# Patient Record
Sex: FEMALE | Race: WHITE | Employment: FULL TIME | URBAN - METROPOLITAN AREA
[De-identification: names, ages, dates, MRNs, and addresses within clinical notes are randomized per-mention and may not be internally consistent; named-entity substitution may affect disease eponyms.]

---

## 2017-05-19 ENCOUNTER — EMPLOYEE WELLNESS (OUTPATIENT)
Dept: OTHER | Age: 62
End: 2017-05-19

## 2017-05-19 LAB
CHOLESTEROL/HDL RATIO: 3.1
CHOLESTEROL: 191 MG/DL
GLUCOSE BLD-MCNC: 96 MG/DL (ref 70–99)
HDLC SERPL-MCNC: 61 MG/DL
LDL CHOLESTEROL: 104 MG/DL (ref 0–130)
PATIENT FASTING?: YES
TRIGL SERPL-MCNC: 128 MG/DL
VLDLC SERPL CALC-MCNC: NORMAL MG/DL (ref 1–30)

## 2018-03-20 VITALS — WEIGHT: 192 LBS | BODY MASS INDEX: 35.69 KG/M2

## 2018-05-18 ENCOUNTER — EMPLOYEE WELLNESS (OUTPATIENT)
Dept: OTHER | Age: 63
End: 2018-05-18

## 2018-05-18 LAB
CHOLESTEROL/HDL RATIO: 3.5
CHOLESTEROL: 194 MG/DL
GLUCOSE BLD-MCNC: 92 MG/DL (ref 70–99)
HDLC SERPL-MCNC: 56 MG/DL
LDL CHOLESTEROL: 118 MG/DL (ref 0–130)
PATIENT FASTING?: YES
TRIGL SERPL-MCNC: 99 MG/DL
VLDLC SERPL CALC-MCNC: NORMAL MG/DL (ref 1–30)

## 2018-05-29 VITALS — WEIGHT: 183 LBS | BODY MASS INDEX: 34.02 KG/M2

## 2018-07-28 ENCOUNTER — HOSPITAL ENCOUNTER (EMERGENCY)
Age: 63
Discharge: HOME | End: 2018-07-28
Attending: EMERGENCY MEDICINE
Payer: COMMERCIAL

## 2018-07-28 ENCOUNTER — APPOINTMENT (OUTPATIENT)
Dept: GENERAL RADIOLOGY | Age: 63
End: 2018-07-28
Payer: COMMERCIAL

## 2018-07-28 ENCOUNTER — NURSE TRIAGE (OUTPATIENT)
Dept: OTHER | Facility: CLINIC | Age: 63
End: 2018-07-28

## 2018-07-28 VITALS
HEIGHT: 62 IN | RESPIRATION RATE: 16 BRPM | SYSTOLIC BLOOD PRESSURE: 150 MMHG | WEIGHT: 180 LBS | OXYGEN SATURATION: 99 % | DIASTOLIC BLOOD PRESSURE: 105 MMHG | HEART RATE: 105 BPM | TEMPERATURE: 98.3 F | BODY MASS INDEX: 33.13 KG/M2

## 2018-07-28 DIAGNOSIS — S62.331A CLOSED DISPLACED FRACTURE OF NECK OF SECOND METACARPAL BONE OF LEFT HAND, INITIAL ENCOUNTER: Primary | ICD-10-CM

## 2018-07-28 DIAGNOSIS — S52.572A OTHER CLOSED INTRA-ARTICULAR FRACTURE OF DISTAL END OF LEFT RADIUS, INITIAL ENCOUNTER: ICD-10-CM

## 2018-07-28 PROCEDURE — 6370000000 HC RX 637 (ALT 250 FOR IP): Performed by: PHYSICIAN ASSISTANT

## 2018-07-28 PROCEDURE — 29126 APPL SHORT ARM SPLINT DYN: CPT

## 2018-07-28 PROCEDURE — 99283 EMERGENCY DEPT VISIT LOW MDM: CPT

## 2018-07-28 PROCEDURE — 73130 X-RAY EXAM OF HAND: CPT

## 2018-07-28 PROCEDURE — 73090 X-RAY EXAM OF FOREARM: CPT

## 2018-07-28 RX ORDER — IBUPROFEN 800 MG/1
800 TABLET ORAL EVERY 8 HOURS PRN
Qty: 20 TABLET | Refills: 0 | Status: SHIPPED | OUTPATIENT
Start: 2018-07-28 | End: 2018-11-13

## 2018-07-28 RX ORDER — IBUPROFEN 800 MG/1
800 TABLET ORAL ONCE
Status: COMPLETED | OUTPATIENT
Start: 2018-07-28 | End: 2018-07-28

## 2018-07-28 RX ORDER — IBUPROFEN 200 MG
200 TABLET ORAL EVERY 6 HOURS PRN
COMMUNITY
End: 2018-11-13

## 2018-07-28 RX ADMIN — IBUPROFEN 800 MG: 800 TABLET ORAL at 13:16

## 2018-07-28 ASSESSMENT — PAIN DESCRIPTION - ORIENTATION: ORIENTATION: LEFT

## 2018-07-28 ASSESSMENT — PAIN SCALES - GENERAL: PAINLEVEL_OUTOF10: 7

## 2018-07-28 ASSESSMENT — PAIN DESCRIPTION - PAIN TYPE: TYPE: ACUTE PAIN

## 2018-07-28 ASSESSMENT — PAIN DESCRIPTION - LOCATION: LOCATION: ARM

## 2018-07-28 NOTE — ED PROVIDER NOTES
[address]  [address]  eMERGENCY dEPARTMENT eNCOUnter   Attending Attestation     Pt Name: Kassie Shelton  MRN: [id number]  [place] 1955  Date of evaluation: 7/28/18   Kassie Shelton is a 58 y.o. female with CC: Arm Injury (left )    MDM:     I personally evaluated and examined the patient in conjunction with the APC and agree with the assessment, treatment plan, and disposition of the patient as recorded by the APC.    Justyna Isabel MD  Attending Emergency Physician           Justyna Isabel MD  07/28/18 [phone]

## 2018-07-28 NOTE — ED NOTES
Applied volar splint and sling to pt's left arm. Pt tolerated well.       Vijay Wright RN  07/28/18 [id number]

## 2018-07-28 NOTE — ED PROVIDER NOTES
[address]  eMERGENCY dEPARTMENT eNCOUnter      Pt Name: Kassie Shelton  MRN: [id number]  [place] 1955  Date of evaluation: 7/28/2018  Provider: Chuy Barrett PA-C    CHIEF COMPLAINT       Chief Complaint   Patient presents with    Arm Injury     left            HISTORY OF PRESENT ILLNESS  (Location/Symptom, Timing/Onset, Context/Setting, Quality, Duration, Modifying Factors, Severity.)   Kassie Shelton is a 58 y.o. female who presents to the emergency department with complaints of left arm pain. Pt states she was pulling franklin of hay off of a wagon when she lost her balance and fell backwards landing on outstretched arm. Pt admits to pain and swelling in distal forearm and hand. Pt has mild soreness in left shoulder. Denies hitting head or loc. No numbness. Pt is right handed. No other complaints. Nursing Notes were reviewed. REVIEW OF SYSTEMS    (2-9 systems for level 4, 10 or more for level 5)     Review of Systems   Left arm injury     Except as noted above the remainder of the review of systems was reviewed and negative. PAST MEDICAL HISTORY   History reviewed. No pertinent past medical history. None otherwise stated in nurses notes    SURGICAL HISTORY       Past Surgical History:   Procedure Laterality Date    KNEE SURGERY      LAPAROSCOPY      tubal     None otherwise stated in nurses notes    CURRENT MEDICATIONS       Previous Medications    IBUPROFEN (ADVIL;MOTRIN) 200 MG TABLET    Take 200 mg by mouth every 6 hours as needed for Pain       ALLERGIES     Patient has no known allergies. FAMILY HISTORY       Family History   Problem Relation Age of Onset    Diabetes Father     Hypertension Father     Diabetes Mother     Hypertension Mother      No family status information on file. None otherwise stated in nurses notes    SOCIAL HISTORY      reports that she has never smoked.  She has never used smokeless tobacco. She reports that she does not drink alcohol or use drugs. lives at home with others     PHYSICAL EXAM    (up to 7 for level 4, 8 or more for level 5)     ED Triage Vitals [07/28/18 1147]   BP Temp Temp src Pulse Resp SpO2 Height Weight   (!) 150/105 98.3 °F (36.8 °C) -- 105 16 99 % 5' 2\" (1.575 m) 180 lb (81.6 kg)       Physical Exam   Nursing note and vitals reviewed. Constitutional: Oriented to person, place, and time and well-developed, well-nourished. Head: Normocephalic and atraumatic. Ear: External ears normal.   Nose: Nose normal and midline. Eyes: Conjunctivae and EOM are normal. Pupils are equal, round, and reactive to light. Neck: Normal range of motion. Neck supple. Throat: Posterior pharynx is without erythema or exudates, airway is patent, no swelling  Cardiovascular: Normal rate, regular rhythm, normal heart sounds and intact distal pulses. Pulmonary/Chest: Effort normal and breath sounds normal. No respiratory distress. No wheezes. No rales. No chest tenderness. Abdominal: Soft. Bowel sounds are normal. No distension and no mass. There is no tenderness. There is no rebound and no guarding. Musculoskeletal: Examination of left arm reveals Normal range of motion of shoulder and elbow. Swelling, erythema, deformity noted over distal radius and ulna. Tenderness in this area and 2nd, 3rd metacarpals. No snuff box tenderness. Good  strength. Brisk cap refill. Distal sensation intact. 2/2 radial pulse. [place] Neurological: Alert and oriented to person, place, and time. GCS score is 15. Skin: Skin is warm and dry. No rash noted. No erythema. No pallor.    Psychiatric: Mood, memory, affect and judgment normal.           DIAGNOSTIC RESULTS     EKG: All EKG's are interpreted by the Emergency Department Physician who either signs or Co-signs this chart in the absence of a cardiologist.        RADIOLOGY:   All plain film, CT, MRI, and formal ultrasound images (except ED bedside ultrasound) are read by the radiologist, see reports

## 2018-07-30 ENCOUNTER — OFFICE VISIT (OUTPATIENT)
Dept: ORTHOPEDIC SURGERY | Age: 63
End: 2018-07-30
Payer: COMMERCIAL

## 2018-07-30 DIAGNOSIS — S52.532A CLOSED COLLES' FRACTURE OF LEFT RADIUS, INITIAL ENCOUNTER: Primary | ICD-10-CM

## 2018-07-30 DIAGNOSIS — M80.00XA AGE-RELATED OSTEOPOROSIS WITH CURRENT PATHOLOGICAL FRACTURE, INITIAL ENCOUNTER: ICD-10-CM

## 2018-07-30 DIAGNOSIS — S62.361A CLOSED NONDISPLACED FRACTURE OF NECK OF SECOND METACARPAL BONE OF LEFT HAND, INITIAL ENCOUNTER: ICD-10-CM

## 2018-07-30 PROCEDURE — 99203 OFFICE O/P NEW LOW 30 MIN: CPT | Performed by: ORTHOPAEDIC SURGERY

## 2018-07-30 NOTE — PROGRESS NOTES
Subjective:      Patient ID: Ronald Mejia is a 58 y.o. female. HPI    Patient pre-stance with a left Colles' severely comminuted intra-articular fracture and a index finger metacarpal head neck fracture post falling off a hay wagon        Review of Systems    Objective:   Physical Exam   Constitutional: She is oriented to person, place, and time. She appears well-developed and well-nourished. HENT:   Head: Normocephalic and atraumatic. Eyes: Conjunctivae and EOM are normal.   Neck: Normal range of motion. Pulmonary/Chest: Effort normal. No respiratory distress. Neurological: She is alert and oriented to person, place, and time. She has normal strength. No sensory deficit. Normal gait   Skin: Skin is warm and dry. Psychiatric: Her behavior is normal. Thought content normal.   Nursing note and vitals reviewed. diagnostic x-rays left wrist reveal impacted comminuted intra-articular Colles' fracture with apparent volar and dorsal column injuries    Patient with left index finger metacarpal head neck fracture significantly shortened    Assessment:      Encounter Diagnoses   Name Primary?     Closed Colles' fracture of left radius, initial encounter Yes    Age-related osteoporosis with current pathological fracture, initial encounter            Plan:      Recommend surgical treatment

## 2018-07-31 ENCOUNTER — ANESTHESIA EVENT (OUTPATIENT)
Dept: OPERATING ROOM | Age: 63
End: 2018-07-31
Payer: COMMERCIAL

## 2018-08-01 ENCOUNTER — APPOINTMENT (OUTPATIENT)
Dept: GENERAL RADIOLOGY | Age: 63
End: 2018-08-01
Attending: ORTHOPAEDIC SURGERY
Payer: COMMERCIAL

## 2018-08-01 ENCOUNTER — ANESTHESIA (OUTPATIENT)
Dept: OPERATING ROOM | Age: 63
End: 2018-08-01
Payer: COMMERCIAL

## 2018-08-01 ENCOUNTER — HOSPITAL ENCOUNTER (OUTPATIENT)
Age: 63
Setting detail: OUTPATIENT SURGERY
Discharge: HOME | End: 2018-08-01
Attending: ORTHOPAEDIC SURGERY | Admitting: ORTHOPAEDIC SURGERY
Payer: COMMERCIAL

## 2018-08-01 VITALS — OXYGEN SATURATION: 99 % | SYSTOLIC BLOOD PRESSURE: 107 MMHG | TEMPERATURE: 96.8 F | DIASTOLIC BLOOD PRESSURE: 69 MMHG

## 2018-08-01 VITALS
TEMPERATURE: 98 F | RESPIRATION RATE: 18 BRPM | HEART RATE: 89 BPM | SYSTOLIC BLOOD PRESSURE: 136 MMHG | BODY MASS INDEX: 33.13 KG/M2 | OXYGEN SATURATION: 94 % | HEIGHT: 62 IN | DIASTOLIC BLOOD PRESSURE: 73 MMHG | WEIGHT: 180 LBS

## 2018-08-01 DIAGNOSIS — S52.532A CLOSED COLLES' FRACTURE OF LEFT RADIUS, INITIAL ENCOUNTER: Primary | ICD-10-CM

## 2018-08-01 PROCEDURE — 73100 X-RAY EXAM OF WRIST: CPT

## 2018-08-01 PROCEDURE — 7100000000 HC PACU RECOVERY - FIRST 15 MIN: Performed by: ORTHOPAEDIC SURGERY

## 2018-08-01 PROCEDURE — 7100000030 HC ASPR PHASE II RECOVERY - FIRST 15 MIN: Performed by: ORTHOPAEDIC SURGERY

## 2018-08-01 PROCEDURE — 2709999900 HC NON-CHARGEABLE SUPPLY: Performed by: ORTHOPAEDIC SURGERY

## 2018-08-01 PROCEDURE — 3600000013 HC SURGERY LEVEL 3 ADDTL 15MIN: Performed by: ORTHOPAEDIC SURGERY

## 2018-08-01 PROCEDURE — 3600000003 HC SURGERY LEVEL 3 BASE: Performed by: ORTHOPAEDIC SURGERY

## 2018-08-01 PROCEDURE — 7100000031 HC ASPR PHASE II RECOVERY - ADDTL 15 MIN: Performed by: ORTHOPAEDIC SURGERY

## 2018-08-01 PROCEDURE — 6360000002 HC RX W HCPCS: Performed by: ORTHOPAEDIC SURGERY

## 2018-08-01 PROCEDURE — 3700000000 HC ANESTHESIA ATTENDED CARE: Performed by: ORTHOPAEDIC SURGERY

## 2018-08-01 PROCEDURE — 6360000002 HC RX W HCPCS: Performed by: ANESTHESIOLOGY

## 2018-08-01 PROCEDURE — 2720000010 HC SURG SUPPLY STERILE: Performed by: ORTHOPAEDIC SURGERY

## 2018-08-01 PROCEDURE — C1713 ANCHOR/SCREW BN/BN,TIS/BN: HCPCS | Performed by: ORTHOPAEDIC SURGERY

## 2018-08-01 PROCEDURE — 2580000003 HC RX 258: Performed by: NURSE ANESTHETIST, CERTIFIED REGISTERED

## 2018-08-01 PROCEDURE — 7100000001 HC PACU RECOVERY - ADDTL 15 MIN: Performed by: ORTHOPAEDIC SURGERY

## 2018-08-01 PROCEDURE — 3700000001 HC ADD 15 MINUTES (ANESTHESIA): Performed by: ORTHOPAEDIC SURGERY

## 2018-08-01 PROCEDURE — 6360000002 HC RX W HCPCS: Performed by: NURSE ANESTHETIST, CERTIFIED REGISTERED

## 2018-08-01 PROCEDURE — 2580000003 HC RX 258: Performed by: ANESTHESIOLOGY

## 2018-08-01 PROCEDURE — 25607 OPTX DST RD XARTC FX/EPI SEP: CPT | Performed by: ORTHOPAEDIC SURGERY

## 2018-08-01 PROCEDURE — 6370000000 HC RX 637 (ALT 250 FOR IP): Performed by: ANESTHESIOLOGY

## 2018-08-01 PROCEDURE — 3209999900 FLUORO FOR SURGICAL PROCEDURES

## 2018-08-01 PROCEDURE — 73120 X-RAY EXAM OF HAND: CPT

## 2018-08-01 PROCEDURE — 26608 TREAT METACARPAL FRACTURE: CPT | Performed by: ORTHOPAEDIC SURGERY

## 2018-08-01 DEVICE — SCREW BNE L14MM DIA2.7MM CORT S STL ST T8 STARDRV RECESS: Type: IMPLANTABLE DEVICE | Site: WRIST | Status: FUNCTIONAL

## 2018-08-01 DEVICE — SCREW BNE L14MM DIA2.4MM S STL ST LOK FULL THRD T8 STARDRV: Type: IMPLANTABLE DEVICE | Site: WRIST | Status: FUNCTIONAL

## 2018-08-01 DEVICE — SCREW BNE L20MM DIA2.4MM S STL ST LOK FULL THRD T8 STARDRV: Type: IMPLANTABLE DEVICE | Site: WRIST | Status: FUNCTIONAL

## 2018-08-01 DEVICE — SCREW BNE L24MM DIA2.4MM S STL ST LOK FULL THRD T8 STARDRV: Type: IMPLANTABLE DEVICE | Site: WRIST | Status: FUNCTIONAL

## 2018-08-01 DEVICE — IMPLANTABLE DEVICE: Type: IMPLANTABLE DEVICE | Site: WRIST | Status: FUNCTIONAL

## 2018-08-01 DEVICE — SCREW BNE L16MM DIA2.7MM CORT S STL ST T8 STARDRV RECESS: Type: IMPLANTABLE DEVICE | Site: WRIST | Status: FUNCTIONAL

## 2018-08-01 RX ORDER — FENTANYL CITRATE 50 UG/ML
INJECTION, SOLUTION INTRAMUSCULAR; INTRAVENOUS PRN
Status: DISCONTINUED | OUTPATIENT
Start: 2018-08-01 | End: 2018-08-01 | Stop reason: SDUPTHER

## 2018-08-01 RX ORDER — ONDANSETRON 2 MG/ML
INJECTION INTRAMUSCULAR; INTRAVENOUS PRN
Status: DISCONTINUED | OUTPATIENT
Start: 2018-08-01 | End: 2018-08-01 | Stop reason: SDUPTHER

## 2018-08-01 RX ORDER — HYDROCODONE BITARTRATE AND ACETAMINOPHEN 5; 325 MG/1; MG/1
1-2 TABLET ORAL EVERY 4 HOURS PRN
Qty: 40 TABLET | Refills: 0 | Status: SHIPPED | OUTPATIENT
Start: 2018-08-01 | End: 2018-08-08

## 2018-08-01 RX ORDER — PROMETHAZINE HYDROCHLORIDE 25 MG/ML
6.25 INJECTION, SOLUTION INTRAMUSCULAR; INTRAVENOUS
Status: COMPLETED | OUTPATIENT
Start: 2018-08-01 | End: 2018-08-01

## 2018-08-01 RX ORDER — DIPHENHYDRAMINE HYDROCHLORIDE 50 MG/ML
12.5 INJECTION INTRAMUSCULAR; INTRAVENOUS
Status: DISCONTINUED | OUTPATIENT
Start: 2018-08-01 | End: 2018-08-01 | Stop reason: HOSPADM

## 2018-08-01 RX ORDER — SODIUM CHLORIDE 0.9 % (FLUSH) 0.9 %
10 SYRINGE (ML) INJECTION PRN
Status: DISCONTINUED | OUTPATIENT
Start: 2018-08-01 | End: 2018-08-01 | Stop reason: HOSPADM

## 2018-08-01 RX ORDER — SODIUM CHLORIDE 0.9 % (FLUSH) 0.9 %
10 SYRINGE (ML) INJECTION EVERY 12 HOURS SCHEDULED
Status: DISCONTINUED | OUTPATIENT
Start: 2018-08-01 | End: 2018-08-01 | Stop reason: HOSPADM

## 2018-08-01 RX ORDER — SODIUM CHLORIDE, SODIUM LACTATE, POTASSIUM CHLORIDE, CALCIUM CHLORIDE 600; 310; 30; 20 MG/100ML; MG/100ML; MG/100ML; MG/100ML
INJECTION, SOLUTION INTRAVENOUS CONTINUOUS PRN
Status: DISCONTINUED | OUTPATIENT
Start: 2018-08-01 | End: 2018-08-01 | Stop reason: SDUPTHER

## 2018-08-01 RX ORDER — PROPOFOL 10 MG/ML
INJECTION, EMULSION INTRAVENOUS PRN
Status: DISCONTINUED | OUTPATIENT
Start: 2018-08-01 | End: 2018-08-01 | Stop reason: SDUPTHER

## 2018-08-01 RX ORDER — CEFAZOLIN SODIUM 1 G/3ML
INJECTION, POWDER, FOR SOLUTION INTRAMUSCULAR; INTRAVENOUS
Status: DISCONTINUED
Start: 2018-08-01 | End: 2018-08-01 | Stop reason: HOSPADM

## 2018-08-01 RX ORDER — DEXAMETHASONE SODIUM PHOSPHATE 4 MG/ML
INJECTION, SOLUTION INTRA-ARTICULAR; INTRALESIONAL; INTRAMUSCULAR; INTRAVENOUS; SOFT TISSUE PRN
Status: DISCONTINUED | OUTPATIENT
Start: 2018-08-01 | End: 2018-08-01 | Stop reason: SDUPTHER

## 2018-08-01 RX ORDER — OXYCODONE HYDROCHLORIDE AND ACETAMINOPHEN 5; 325 MG/1; MG/1
1 TABLET ORAL
Status: COMPLETED | OUTPATIENT
Start: 2018-08-01 | End: 2018-08-01

## 2018-08-01 RX ORDER — SODIUM CHLORIDE, SODIUM LACTATE, POTASSIUM CHLORIDE, CALCIUM CHLORIDE 600; 310; 30; 20 MG/100ML; MG/100ML; MG/100ML; MG/100ML
INJECTION, SOLUTION INTRAVENOUS CONTINUOUS
Status: DISCONTINUED | OUTPATIENT
Start: 2018-08-01 | End: 2018-08-01 | Stop reason: HOSPADM

## 2018-08-01 RX ORDER — MORPHINE SULFATE 2 MG/ML
2 INJECTION, SOLUTION INTRAMUSCULAR; INTRAVENOUS EVERY 5 MIN PRN
Status: DISCONTINUED | OUTPATIENT
Start: 2018-08-01 | End: 2018-08-01 | Stop reason: HOSPADM

## 2018-08-01 RX ORDER — LIDOCAINE HYDROCHLORIDE 10 MG/ML
1 INJECTION, SOLUTION EPIDURAL; INFILTRATION; INTRACAUDAL; PERINEURAL
Status: DISCONTINUED | OUTPATIENT
Start: 2018-08-01 | End: 2018-08-01 | Stop reason: HOSPADM

## 2018-08-01 RX ORDER — MIDAZOLAM HYDROCHLORIDE 1 MG/ML
INJECTION INTRAMUSCULAR; INTRAVENOUS PRN
Status: DISCONTINUED | OUTPATIENT
Start: 2018-08-01 | End: 2018-08-01 | Stop reason: SDUPTHER

## 2018-08-01 RX ADMIN — MIDAZOLAM 2 MG: 1 INJECTION INTRAMUSCULAR; INTRAVENOUS at 13:36

## 2018-08-01 RX ADMIN — FENTANYL CITRATE 50 MCG: 50 INJECTION, SOLUTION INTRAMUSCULAR; INTRAVENOUS at 13:52

## 2018-08-01 RX ADMIN — ONDANSETRON 4 MG: 2 INJECTION INTRAMUSCULAR; INTRAVENOUS at 13:56

## 2018-08-01 RX ADMIN — HYDROMORPHONE HYDROCHLORIDE 0.5 MG: 1 INJECTION, SOLUTION INTRAMUSCULAR; INTRAVENOUS; SUBCUTANEOUS at 16:05

## 2018-08-01 RX ADMIN — Medication 2 G: at 13:38

## 2018-08-01 RX ADMIN — FENTANYL CITRATE 50 MCG: 50 INJECTION, SOLUTION INTRAMUSCULAR; INTRAVENOUS at 13:47

## 2018-08-01 RX ADMIN — SODIUM CHLORIDE, POTASSIUM CHLORIDE, SODIUM LACTATE AND CALCIUM CHLORIDE: 600; 310; 30; 20 INJECTION, SOLUTION INTRAVENOUS at 12:32

## 2018-08-01 RX ADMIN — FENTANYL CITRATE 50 MCG: 50 INJECTION, SOLUTION INTRAMUSCULAR; INTRAVENOUS at 14:33

## 2018-08-01 RX ADMIN — HYDROMORPHONE HYDROCHLORIDE 0.5 MG: 1 INJECTION, SOLUTION INTRAMUSCULAR; INTRAVENOUS; SUBCUTANEOUS at 15:53

## 2018-08-01 RX ADMIN — FENTANYL CITRATE 50 MCG: 50 INJECTION, SOLUTION INTRAMUSCULAR; INTRAVENOUS at 14:14

## 2018-08-01 RX ADMIN — PROMETHAZINE HYDROCHLORIDE 6.25 MG: 25 INJECTION INTRAMUSCULAR; INTRAVENOUS at 15:41

## 2018-08-01 RX ADMIN — OXYCODONE HYDROCHLORIDE AND ACETAMINOPHEN 1 TABLET: 5; 325 TABLET ORAL at 17:42

## 2018-08-01 RX ADMIN — SODIUM CHLORIDE, POTASSIUM CHLORIDE, SODIUM LACTATE AND CALCIUM CHLORIDE: 600; 310; 30; 20 INJECTION, SOLUTION INTRAVENOUS at 13:36

## 2018-08-01 RX ADMIN — DEXAMETHASONE SODIUM PHOSPHATE 4 MG: 4 INJECTION, SOLUTION INTRAMUSCULAR; INTRAVENOUS at 13:56

## 2018-08-01 RX ADMIN — PROPOFOL 200 MG: 10 INJECTION, EMULSION INTRAVENOUS at 13:41

## 2018-08-01 RX ADMIN — MORPHINE SULFATE 2 MG: 2 INJECTION, SOLUTION INTRAMUSCULAR; INTRAVENOUS at 15:37

## 2018-08-01 ASSESSMENT — PULMONARY FUNCTION TESTS
PIF_VALUE: 11
PIF_VALUE: 1
PIF_VALUE: 10
PIF_VALUE: 10
PIF_VALUE: 11
PIF_VALUE: 11
PIF_VALUE: 1
PIF_VALUE: 11
PIF_VALUE: 1
PIF_VALUE: 11
PIF_VALUE: 11
PIF_VALUE: 5
PIF_VALUE: 4
PIF_VALUE: 3
PIF_VALUE: 11
PIF_VALUE: 10
PIF_VALUE: 11
PIF_VALUE: 10
PIF_VALUE: 10
PIF_VALUE: 11
PIF_VALUE: 10
PIF_VALUE: 11
PIF_VALUE: 10
PIF_VALUE: 11
PIF_VALUE: 10
PIF_VALUE: 10
PIF_VALUE: 11
PIF_VALUE: 10
PIF_VALUE: 11
PIF_VALUE: 10
PIF_VALUE: 11
PIF_VALUE: 1
PIF_VALUE: 11
PIF_VALUE: 11
PIF_VALUE: 1
PIF_VALUE: 11
PIF_VALUE: 2
PIF_VALUE: 3
PIF_VALUE: 11
PIF_VALUE: 10
PIF_VALUE: 11
PIF_VALUE: 10
PIF_VALUE: 11
PIF_VALUE: 11
PIF_VALUE: 0
PIF_VALUE: 3
PIF_VALUE: 15
PIF_VALUE: 1
PIF_VALUE: 11
PIF_VALUE: 15
PIF_VALUE: 11
PIF_VALUE: 10
PIF_VALUE: 1
PIF_VALUE: 2
PIF_VALUE: 10
PIF_VALUE: 11
PIF_VALUE: 4
PIF_VALUE: 11

## 2018-08-01 ASSESSMENT — PAIN DESCRIPTION - ORIENTATION
ORIENTATION: LEFT

## 2018-08-01 ASSESSMENT — PAIN DESCRIPTION - DESCRIPTORS
DESCRIPTORS: ACHING

## 2018-08-01 ASSESSMENT — PAIN DESCRIPTION - LOCATION
LOCATION: WRIST

## 2018-08-01 ASSESSMENT — PAIN DESCRIPTION - PAIN TYPE
TYPE: SURGICAL PAIN

## 2018-08-01 ASSESSMENT — PAIN SCALES - GENERAL
PAINLEVEL_OUTOF10: 4
PAINLEVEL_OUTOF10: 9
PAINLEVEL_OUTOF10: 4
PAINLEVEL_OUTOF10: 7
PAINLEVEL_OUTOF10: 6
PAINLEVEL_OUTOF10: 8

## 2018-08-01 ASSESSMENT — PAIN - FUNCTIONAL ASSESSMENT: PAIN_FUNCTIONAL_ASSESSMENT: 0-10

## 2018-08-01 ASSESSMENT — PAIN DESCRIPTION - FREQUENCY: FREQUENCY: CONTINUOUS

## 2018-08-01 ASSESSMENT — ENCOUNTER SYMPTOMS: STRIDOR: 0

## 2018-08-01 NOTE — H&P
HISTORY and [address]       NAME:  Mary Colunga  MRN: [id number]   YOB: 1955   Date: 8/1/2018   Age: 58 y.o. Gender: female     COMPLAINT AND PRESENT HISTORY:     Mary Colunga is a 58 y.o.  female, here today for open reduction with internal fixation of left wrist. Patient reports history of injury to left arm, was pulling franklin of hay off of a wagon when she lost her balance and fell backwards landing on outstretched arm. Patient reports immediate pain and swelling in the hand and forearm. Patient presented to emergency room where xrays revealed distal radial metaphysis fracture as well as fracture of second metacarpal. No weakness or numbness in the distal extremity. Pain well controlled with motrin, stopped in anticipation of surgery. She is in excellent health otherwise, no medical history, no recent fever or chills, chest pain or shortness of breath. DIAGNOSTIC RESULTS   Radiology:   Narrative   EXAMINATION:   3 XRAY VIEWS OF THE LEFT HAND       7/28/2018 11:57 am       FINDINGS:   Osteopenia with superimposed osteoarthrosis of the interphalangeal joints and   1st carpometacarpal joint.       There is fracture of the distal metaphysis of the 2nd metacarpal with ulnar   displacement of the metacarpal head.  The head appears maintain normal   articulation with the proximal phalanx.       There is distal radial metaphysis fracture with mildly displaced distal   fragment.       Impression   1. Distal left 2nd metacarpal fracture with displacement of the metacarpal   head. 2. Distal left radial metaphysis fracture. 3. Osteoarthrosis of the left hand.         PAST MEDICAL HISTORY   History reviewed. No pertinent past medical history.     SURGICAL HISTORY       Past Surgical History:   Procedure Laterality Date    KNEE SURGERY Bilateral     arthroscopy    LAPAROSCOPY      tubal     FAMILY HISTORY       Family History   Problem Relation Age of Onset   

## 2018-08-02 NOTE — OP NOTE
[address] [facility]                   [address] [facility], [address]                                 OPERATIVE REPORT    PATIENT NAME: Martín Aparicio                       :        1955  MED REC NO:   [id number]                              ROOM:  ACCOUNT NO:   [de-identified]                           ADMIT DATE: 2018  PROVIDER:     Karen Ferguson    DATE OF PROCEDURE:  2018    PREOPERATIVE DIAGNOSES:  1. Left index finger metacarpal neck fracture. 2.  Left comminuted distal radius intraarticular fracture with volar  displacement. POSTOPERATIVE DIAGNOSES:  1. Left index finger metacarpal neck fracture. 2.  Left comminuted distal radius intraarticular fracture with volar  displacement. OPERATION PERFORMED:  1. Open reduction and internal fixation left distal radius fracture with  fluoroscopic assistance. 2.  Closed reduction percutaneous pinning left index finger with  fluoroscopic assistance. OPERATING SURGEON:  Karen Ferguson MD    ANESTHESIA:  General.    FINDINGS:  1. The patient with index finger metacarpal head neck very distal neck  fracture, very acceptable reduction with closed reduction. This was then  stabilized with 1.3 mm K-wires in a cross K-wire configuration leading to  very acceptable stability. We did end up pinning a little bit of the  collateral ligament likely as I was only able to get the MP flexed about 50  degrees post pinning when we went to splint the patient at the end of the  case. 2.  Significantly volarly displaced with volar and dorsal column injury and  radial styloid injury, intraarticular Colles fracture. This was stabilized  with a Synthes volar distal locking plate. Acceptable reduction largely at  the end of reduction with neutral volar inclination however. PROCEDURE IN DETAIL:  The patient was taken to the operating room, placed  under general anesthesia.   Left upper extremity was prepped and draped in  the usual sterile fashion. Closed reduction maneuver was performed on the  finger and was found to lengthen and the fracture appeared to very  acceptably reduced. Due to the fact that the fracture tended to shift medial lateral really  more to the ulnar side, the ulnar side of the digit was pinned first.  This  led to excellent initial stability and alignment. A radial cross K-wire  was then placed. Final x-rays were assessed. Actually at this time the ulnarly based K-wire  bounced off the diaphysis and traversed down the diaphyseal portion of the  metacarpal and therefore was not a stable pin as I would have liked for  potentially either pushing in or backing out. I was able to pull this pin  and then surprisingly put a second pin in, fortunately we found the same  hole and I advanced it a little bit more proximal into the metaphyseal bone  at the proximal end of the metaphysis. Both K-wires were then pre-bent,  cut and contoured. Attention was now directed to the Colles fracture. Volar approach was  made. Pronator quadratus and part of the pronator was taken down. The  fracture was evaluated. The patient was found to have significant volar  displacement, a separate radial styloid fragment. Initial reduction attempts were not quite ideal.  I therefore elected to  perform most of the initial reduction by placing the volar locking plate  initially as a buttress and this reduced the major volar displacement and  fragment quite nicely. There was still little displacement of the radial  styloid. This was initially pinned and this looked pretty good although  the pin was for temporary stability while we finish the plate. Actually at  this juncture we slightly repositioned the distal radius plate _____  slightly distally and improved the alignment on the plate as well. Then,  the remainder of the three proximal cortical screws were placed.     The most radial of the locking screws was then

## 2018-08-09 ENCOUNTER — TELEPHONE (OUTPATIENT)
Dept: ORTHOPEDIC SURGERY | Age: 63
End: 2018-08-09

## 2018-08-14 ENCOUNTER — OFFICE VISIT (OUTPATIENT)
Dept: ORTHOPEDIC SURGERY | Age: 63
End: 2018-08-14

## 2018-08-14 DIAGNOSIS — S52.532D CLOSED COLLES' FRACTURE OF LEFT RADIUS WITH ROUTINE HEALING, SUBSEQUENT ENCOUNTER: ICD-10-CM

## 2018-08-14 DIAGNOSIS — S62.361D CLOSED NONDISPLACED FRACTURE OF NECK OF SECOND METACARPAL BONE OF LEFT HAND WITH ROUTINE HEALING, SUBSEQUENT ENCOUNTER: Primary | ICD-10-CM

## 2018-08-14 PROCEDURE — 99024 POSTOP FOLLOW-UP VISIT: CPT | Performed by: ORTHOPAEDIC SURGERY

## 2018-08-14 RX ORDER — IBUPROFEN 800 MG/1
800 TABLET ORAL EVERY 8 HOURS PRN
Qty: 120 TABLET | Refills: 0 | Status: SHIPPED | OUTPATIENT
Start: 2018-08-14 | End: 2018-11-13

## 2018-08-28 ENCOUNTER — OFFICE VISIT (OUTPATIENT)
Dept: ORTHOPEDIC SURGERY | Age: 63
End: 2018-08-28
Payer: COMMERCIAL

## 2018-08-28 DIAGNOSIS — S52.532D CLOSED COLLES' FRACTURE OF LEFT RADIUS WITH ROUTINE HEALING, SUBSEQUENT ENCOUNTER: ICD-10-CM

## 2018-08-28 DIAGNOSIS — S62.361D CLOSED NONDISPLACED FRACTURE OF NECK OF SECOND METACARPAL BONE OF LEFT HAND WITH ROUTINE HEALING, SUBSEQUENT ENCOUNTER: ICD-10-CM

## 2018-08-28 DIAGNOSIS — M80.00XD AGE-RELATED OSTEOPOROSIS WITH CURRENT PATHOLOGICAL FRACTURE WITH ROUTINE HEALING, SUBSEQUENT ENCOUNTER: Primary | ICD-10-CM

## 2018-08-28 PROCEDURE — 20670 REMOVAL IMPLANT SUPERFICIAL: CPT | Performed by: ORTHOPAEDIC SURGERY

## 2018-08-28 PROCEDURE — 99024 POSTOP FOLLOW-UP VISIT: CPT | Performed by: ORTHOPAEDIC SURGERY

## 2018-09-04 ENCOUNTER — OFFICE VISIT (OUTPATIENT)
Dept: ORTHOPEDIC SURGERY | Age: 63
End: 2018-09-04

## 2018-09-04 DIAGNOSIS — S52.532D CLOSED COLLES' FRACTURE OF LEFT RADIUS WITH ROUTINE HEALING, SUBSEQUENT ENCOUNTER: Primary | ICD-10-CM

## 2018-09-04 DIAGNOSIS — S62.361D CLOSED NONDISPLACED FRACTURE OF NECK OF SECOND METACARPAL BONE OF LEFT HAND WITH ROUTINE HEALING, SUBSEQUENT ENCOUNTER: ICD-10-CM

## 2018-09-04 PROCEDURE — 99024 POSTOP FOLLOW-UP VISIT: CPT | Performed by: ORTHOPAEDIC SURGERY

## 2018-09-05 ENCOUNTER — HOSPITAL ENCOUNTER (OUTPATIENT)
Dept: OCCUPATIONAL THERAPY | Age: 63
Setting detail: THERAPIES SERIES
Discharge: HOME | End: 2018-09-05
Payer: COMMERCIAL

## 2018-09-05 PROCEDURE — 97110 THERAPEUTIC EXERCISES: CPT

## 2018-09-05 PROCEDURE — 97166 OT EVAL MOD COMPLEX 45 MIN: CPT

## 2018-09-05 ASSESSMENT — 9 HOLE PEG TEST
TEST_RESULT: IMPAIRED
TEST_RESULT: FUNCTIONAL
TESTTIME_SECONDS: 18
TESTTIME_SECONDS: 87

## 2018-09-05 ASSESSMENT — PAIN DESCRIPTION - FREQUENCY: FREQUENCY: INTERMITTENT

## 2018-09-05 ASSESSMENT — PAIN DESCRIPTION - ORIENTATION: ORIENTATION: LEFT

## 2018-09-05 ASSESSMENT — PAIN DESCRIPTION - LOCATION: LOCATION: WRIST;HAND

## 2018-09-05 ASSESSMENT — PAIN DESCRIPTION - PAIN TYPE: TYPE: ACUTE PAIN

## 2018-09-05 ASSESSMENT — PAIN SCALES - GENERAL: PAINLEVEL_OUTOF10: 6

## 2018-09-05 NOTE — PROGRESS NOTES
abd/adduction,make a fist, MPs extended w PIP/DIP (flexed then extended), MP flexion/extension w PIPs /DIPs extended)), towel crunches, finger tapping. Yellow foam block for lt hand gripping & pinching. Coban wrap to lt (index,long, ring, little fingers ) to decrease swelling. Other exercises 2: PROM and edema massage lt wrist and hand  Other exercises 3: Lt hand finger blocking exercises : thumb IP, index, long, ring, little fingers (PIP and DIPs) 15x each   Assessment  Performance deficits / Impairments: Decreased ROM, Decreased coordination, Decreased strength  Treatment Diagnosis: Lt hand/wrist weakness, stiffness, impaired coordination, pain, swelling  Prognosis: Good  Decision Making: Medium Complexity  History: 8/1/2018 CLOSED REDUCTION PINNING LEFT INDEX FINGER . 8/1/2018 LEFT WRIST OPEN REDUCTION INTERNAL FIXATION. Pt was casted 4 weeks  Exam: Pt's lt wrist/hand,  , pinch strength, lt hand dexterity w 9 hole peg test - BNLS. Assistance / Modification: Pt has stiffness,swelling, & pain w movement lt wrist /hand. Pt limited when using lt hand for daily activities. Pt is rt hand dominant so she uses rt hand a lot. Patient Education: OT provided pt w extensive HEP- see OT exercises for details. Pt correctly demonstrates HEP for lt wrist/hand. Exercise handouts issued to pt and copy of handouts in pt's chart. Pt instructed to remove coban wrap periodically during the day & use it at night on lt fingers to decrease the swelling. OT told pt to song & dorsal side lt wrist /hand(10 minutes each) to decrese swelling/pain. OT told pt she could soak lt wrist in warm water and do some exercises. Barriers to Learning: None  REQUIRES OT FOLLOW UP: Yes  Treatment Initiated : HEP lt wrist/hand, PROM and edema massage lt wrist/hand  Discharge Recommendations: Outpatient OT   Goals  Patient Goals   Patient goals : To be able to make fist, to use lt hand as good as lt hand.    Short term goals  Time Frame for Short flexion(lt hand AROM -wfls), and make a fist using lt hand. Long term goal 8: Compared to eval increase AROM lt wrist (flexion and extension by 20-25, UD by 15-20)  Long term goal 9: PROM lt hand - wfls  Plan  REQUIRES OT FOLLOW UP: Yes  Plan  Times per week: 4-5  Plan weeks: 6 weeks  Current Treatment Recommendations: ROM, Strengthening, Patient/Caregiver Education & Training (coordination, progress to strengthening)  Plan Comment: Continue OT  OT Individual Minutes  Time In: 2136  Time Out: 1550  Minutes: 75  Time Code Minutes   Timed Code Treatment Minutes: 30 Minutes  Rehab Potential:  [x] Good  [] Cullen Anderson  [] Poor   Suggested Professional Referral:  [] No  [] Yes:  Barriers to Goal Achievement:  [x] No  [] Yes: Domestic Concerns:  [x] No  [] Yes:  Treatment Plan:  [x] Therapeutic Exercise      [x] Instruction in HEP       Frequency:      4-5     X/wk x      6    wk's    [x] Plans/Goals, Risk/Benefits discussed with pt  Comprehension of Education [x] D/V Understanding  [] Needs Review  Pt Education: [x] Verbal  [x] Demo  [x] Written    Regulatory Requirements:   I have reviewed this plan of care and certify a need for Medically necessary rehabilitation services.         [x] Physician Signature                                      Date:   [address] [facility]  [address] [facility], [address] [place] [address],[address]   [address] [facility], [address]  Phone: [phone]  Fax: [phone]  Electronically signed by Romy Ferreira OT on 9/5/18 at 6:55 PM    Treatment Charges:  Minutes Units   Ultrasound     Electrical Stim     Iontophoresis     Paraffin      Massage     Eval 30 1   ADL      Ther Exercise 30 2   Ther Activities     Neuro Re-Ed     Splinting      Other     Total Treatment Time:

## 2018-09-18 ENCOUNTER — HOSPITAL ENCOUNTER (OUTPATIENT)
Dept: OCCUPATIONAL THERAPY | Age: 63
Setting detail: THERAPIES SERIES
Discharge: HOME | End: 2018-09-18
Payer: COMMERCIAL

## 2018-09-18 PROCEDURE — 97110 THERAPEUTIC EXERCISES: CPT

## 2018-09-18 ASSESSMENT — PAIN DESCRIPTION - ORIENTATION: ORIENTATION: LEFT

## 2018-09-18 ASSESSMENT — PAIN SCALES - GENERAL: PAINLEVEL_OUTOF10: 2

## 2018-09-18 ASSESSMENT — PAIN DESCRIPTION - DESCRIPTORS: DESCRIPTORS: ACHING;TIGHTNESS

## 2018-09-18 ASSESSMENT — 9 HOLE PEG TEST
TEST_RESULT: IMPAIRED
TESTTIME_SECONDS: 29

## 2018-09-18 ASSESSMENT — PAIN DESCRIPTION - LOCATION: LOCATION: FINGER (COMMENT WHICH ONE)

## 2018-09-18 ASSESSMENT — PAIN DESCRIPTION - PAIN TYPE: TYPE: ACUTE PAIN

## 2018-09-18 ASSESSMENT — PAIN DESCRIPTION - FREQUENCY: FREQUENCY: INTERMITTENT

## 2018-09-18 NOTE — PROGRESS NOTES
coordination, pain, swelling  Prognosis: Good  Patient Education: Contrast bath, finger sleeves  Barriers to Learning: None  REQUIRES OT FOLLOW UP: Yes  Discharge Recommendations: Outpatient OT    Plan  REQUIRES OT FOLLOW UP: Yes     OT Individual Minutes  Time In: 1902  Time Out: 7455  Minutes: 48  Time Code Minutes   Timed Code Treatment Minutes: 50 Minutes    Rehab Potential:  [x] Good  [] Fair  [] Poor   Suggested Professional Referral:  [x] No  [] Yes:  Barriers to Goal Achievement:  [x] No  [] Yes:  Domestic Concerns:  [x] No  [] Yes:    Treatment Plan:  [x] Therapeutic Exercise    [] Modalities:  [x] Therapeutic Activity    [] Ultrasound   [] Neuromuscular Re-education   [] Massage         [] ADL     [] Electrical Stimulation  [x] Instruction in HEP       [] Iontophoresis: 4 mg/mL     Dexamethasone Sodium     Phosphate 40-80mAmin     Frequency:     4-5      X/wk x     6     wk's    [x] Plans/Goals, Risk/Benefits discussed with pt/family  Comprehension of Education [x] D/V Understanding   [x] Needs Review  Pt/Family Education: [x] Verbal  [x] Demo  [] Written    Medicare/Regulatory Requirements:     I have reviewed this plan of care and certify a need for Medically necessary rehabilitation services.         [x] Physician Signature                                      Date:     [address] [facility]  [address] [facility] [address]   [address] [facility], [address]  Phone: [phone]  Fax: [phone]  Electronically signed by Key Pacheco on 9/18/18 at 9:32 AM    Treatment Charges:  Minutes Units   Ultrasound     Electrical Stim     Iontophoresis     Paraffin      Massage     Eval 28 (re-eval)    ADL      Ther Exercise 20 1   Ther Activities     Neuro Re-Ed     Splinting      Other     Total Treatment Time:  48 1

## 2018-09-19 ENCOUNTER — HOSPITAL ENCOUNTER (OUTPATIENT)
Dept: OCCUPATIONAL THERAPY | Age: 63
Setting detail: THERAPIES SERIES
Discharge: HOME | End: 2018-09-19
Payer: COMMERCIAL

## 2018-09-19 PROCEDURE — 97110 THERAPEUTIC EXERCISES: CPT

## 2018-09-19 NOTE — PROGRESS NOTES
stacking/unstacking 11 cubes using lt hand 3sets   Other exercises 9: large cone using lt hand to push into putty 30x  Assessment  Performance deficits / Impairments: Decreased ROM, Decreased coordination, Decreased strength  Assessment: OT put on finger edema sleeves because pt can't do using just 1 hand. Tx focus on edema control, lt wrist/hand PROM & stretching, and begun lt wrist /hand gross and manual dexterity exercises. Pt had difficulty manipulating juxaciser but able to complete exercise. Pt reports lt wrist soreness when doing cone stack/unstack for supination/pronation. See OT exercises for details.   Treatment Diagnosis: Lt hand/wrist weakness, stiffness, impaired coordination, pain, swelling  Prognosis: Good  REQUIRES OT FOLLOW UP: Yes  Discharge Recommendations: Outpatient OT           Plan  REQUIRES OT FOLLOW UP: Yes  Plan  Times per week: 4-5  Plan weeks: 6 weeks  Current Treatment Recommendations: ROM, Strengthening, Patient/Caregiver Education & Training (coordination)  Plan Comment: Continue OT  OT Individual Minutes  Time In: 3503  Time Out: 1009  Minutes: 51  Time Code Minutes   Timed Code Treatment Minutes: 51 Minutes    Electronically signed by Enoch Becker OT on 9/19/18 at 10:20 AM          Treatment Charges:  Minutes Units   Ultrasound     Electrical Stim     Iontophoresis     Paraffin      Massage     Eval     ADL      Ther Exercise 51 3   Ther Activities     Neuro Re-Ed     Splinting      Other     Total Treatment Time:  51

## 2018-09-20 ENCOUNTER — HOSPITAL ENCOUNTER (OUTPATIENT)
Dept: OCCUPATIONAL THERAPY | Age: 63
Setting detail: THERAPIES SERIES
Discharge: HOME | End: 2018-09-20
Payer: COMMERCIAL

## 2018-09-20 ENCOUNTER — OFFICE VISIT (OUTPATIENT)
Dept: ORTHOPEDIC SURGERY | Age: 63
End: 2018-09-20
Payer: COMMERCIAL

## 2018-09-20 DIAGNOSIS — S52.532D CLOSED COLLES' FRACTURE OF LEFT RADIUS WITH ROUTINE HEALING, SUBSEQUENT ENCOUNTER: ICD-10-CM

## 2018-09-20 DIAGNOSIS — S62.361D CLOSED NONDISPLACED FRACTURE OF NECK OF SECOND METACARPAL BONE OF LEFT HAND WITH ROUTINE HEALING, SUBSEQUENT ENCOUNTER: Primary | ICD-10-CM

## 2018-09-20 DIAGNOSIS — M25.512 PAIN IN JOINT OF LEFT SHOULDER: ICD-10-CM

## 2018-09-20 PROCEDURE — 97110 THERAPEUTIC EXERCISES: CPT

## 2018-09-20 PROCEDURE — 20610 DRAIN/INJ JOINT/BURSA W/O US: CPT | Performed by: ORTHOPAEDIC SURGERY

## 2018-09-20 PROCEDURE — 99024 POSTOP FOLLOW-UP VISIT: CPT | Performed by: ORTHOPAEDIC SURGERY

## 2018-09-20 RX ORDER — BETAMETHASONE SODIUM PHOSPHATE AND BETAMETHASONE ACETATE 3; 3 MG/ML; MG/ML
12 INJECTION, SUSPENSION INTRA-ARTICULAR; INTRALESIONAL; INTRAMUSCULAR; SOFT TISSUE ONCE
Status: COMPLETED | OUTPATIENT
Start: 2018-09-20 | End: 2018-09-20

## 2018-09-20 RX ORDER — BUPIVACAINE HYDROCHLORIDE 5 MG/ML
2 INJECTION, SOLUTION PERINEURAL ONCE
Status: COMPLETED | OUTPATIENT
Start: 2018-09-20 | End: 2018-09-20

## 2018-09-20 RX ORDER — LIDOCAINE HYDROCHLORIDE 10 MG/ML
2 INJECTION, SOLUTION EPIDURAL; INFILTRATION; INTRACAUDAL; PERINEURAL ONCE
Status: COMPLETED | OUTPATIENT
Start: 2018-09-20 | End: 2018-09-20

## 2018-09-20 RX ADMIN — BUPIVACAINE HYDROCHLORIDE 10 MG: 5 INJECTION, SOLUTION PERINEURAL at 14:45

## 2018-09-20 RX ADMIN — LIDOCAINE HYDROCHLORIDE 2 ML: 10 INJECTION, SOLUTION EPIDURAL; INFILTRATION; INTRACAUDAL; PERINEURAL at 14:46

## 2018-09-20 RX ADMIN — BETAMETHASONE SODIUM PHOSPHATE AND BETAMETHASONE ACETATE 12 MG: 3; 3 INJECTION, SUSPENSION INTRA-ARTICULAR; INTRALESIONAL; INTRAMUSCULAR; SOFT TISSUE at 14:45

## 2018-09-20 ASSESSMENT — PAIN DESCRIPTION - ORIENTATION: ORIENTATION: LEFT

## 2018-09-20 ASSESSMENT — PAIN DESCRIPTION - DESCRIPTORS: DESCRIPTORS: ACHING

## 2018-09-20 ASSESSMENT — PAIN DESCRIPTION - PAIN TYPE: TYPE: ACUTE PAIN

## 2018-09-20 ASSESSMENT — PAIN DESCRIPTION - LOCATION: LOCATION: FINGER (COMMENT WHICH ONE)

## 2018-09-20 ASSESSMENT — PAIN DESCRIPTION - FREQUENCY: FREQUENCY: INTERMITTENT

## 2018-09-20 ASSESSMENT — PAIN SCALES - GENERAL: PAINLEVEL_OUTOF10: 2

## 2018-09-20 ASSESSMENT — PAIN DESCRIPTION - PROGRESSION: CLINICAL_PROGRESSION: GRADUALLY IMPROVING

## 2018-09-20 NOTE — PROGRESS NOTES
[address]  Rehabilitation Services  Occupational Therapy Treatment Note  Date: 18  Patient Name: Doyle Sherman    MRN: [id number]  Account: [de-identified]   : 1955  (64 y.o.) Gender: female     General  Additional Pertinent Hx: 2018 CLOSED REDUCTION PINNING LEFT INDEX FINGER . 2018  LEFT WRIST OPEN REDUCTION INTERNAL FIXATION    Referring Practitioner: Dr Macario Caicedo   Diagnosis: Closed colles fx of lt radius (ICD-10 code S52.532D),closed nondisplaced fx of neck of 2nd metacarpal bone lt hand (ICD-10 code-S62.361)  OT Visit Information  OT Insurance Information: [employer]  Total # of Visits Approved: 30  Total # of Visits to Date: 4  Progress Note Counter: Therapy 5x/week for 6 weeks. MD appt   Subjective  Subjective:  \"It is very swollen today but it might be bcause I was out mowing the lawn yesterday\"  Pain Assessment  Patient Currently in Pain: Yes  Pain Assessment: 0-10  Pain Level: 2  Pain Type: Acute pain  Pain Location: Finger (Comment which one) (Small finger)  Pain Orientation: Left  Pain Descriptors: Aching  Pain Frequency: Intermittent  Clinical Progression: Gradually improving  Patient's Stated Pain Goal: No pain     Other exercises  Other exercises 2: PROM and edema massage lt wrist and hand  Other exercises 3: Lt hand finger blocking exercises : thumb IP, index, long, ring, little fingers (PIP and DIPs) 25x each   Other exercises 4: Red /beige pegs using lt hand place all 8 rows, then using lt hand flip over all pegs in 8 rows (Placed back alternating fingers w/thumb one row each)  Other exercises 6: plyoball lt wrist stretch 20x each way- flex/extension, RD/UD, circles cw/ccw  Other exercises 7: lt UE cone stacking/unstacking- move 11 cones over and back 1 set, move 11 cones stack/unstack for supination/pronation  Other exercises 9: small cone using lt hand to push into putty 30x  Assessment  Performance deficits / Impairments: Decreased ROM, Decreased coordination, Decreased strength  Assessment: OT treatment focused on decreasing pain/edema and increasing ROM/strength/coordination of L hand/wrist - see OT exercise sheet for detailed report. Pt tolerated upgraded difficulty on some exercises this date. Pt used small end of cone in putty vs large one. Pt also used isolated opposition of each finger to place red/beige pegs, demonstrating minimal difficulty with small finger. Pt tolerated treatment well and reported that she was heading to see the MD after appointment.   Treatment Diagnosis: Lt hand/wrist weakness, stiffness, impaired coordination, pain, swelling  Prognosis: Good  REQUIRES OT FOLLOW UP: Yes  Discharge Recommendations: Outpatient OT     Plan  REQUIRES OT FOLLOW UP: Yes     OT Individual Minutes  Time In: 3090  Time Out: 1348  Minutes: 44  Time Code Minutes   Timed Code Treatment Minutes: 44 Minutes    Electronically signed by Mirta Andrews on 9/20/18 at 4:08 PM    Treatment Charges:  Minutes Units   Ultrasound     Electrical Stim     Iontophoresis     Paraffin      Massage     Eval     ADL      Ther Exercise 44 3   Ther Activities     Neuro Re-Ed     Splinting      Other     Total Treatment Time:  44 3

## 2018-09-21 ENCOUNTER — HOSPITAL ENCOUNTER (OUTPATIENT)
Dept: OCCUPATIONAL THERAPY | Age: 63
Setting detail: THERAPIES SERIES
Discharge: HOME | End: 2018-09-21
Payer: COMMERCIAL

## 2018-09-21 PROCEDURE — 97110 THERAPEUTIC EXERCISES: CPT

## 2018-09-21 ASSESSMENT — PAIN DESCRIPTION - LOCATION: LOCATION: WRIST;FINGER (COMMENT WHICH ONE)

## 2018-09-21 ASSESSMENT — PAIN DESCRIPTION - ORIENTATION: ORIENTATION: LEFT

## 2018-09-21 ASSESSMENT — PAIN DESCRIPTION - DESCRIPTORS: DESCRIPTORS: ACHING

## 2018-09-21 ASSESSMENT — PAIN DESCRIPTION - PAIN TYPE: TYPE: ACUTE PAIN

## 2018-09-21 ASSESSMENT — PAIN DESCRIPTION - PROGRESSION: CLINICAL_PROGRESSION: NOT CHANGED

## 2018-09-21 ASSESSMENT — PAIN DESCRIPTION - FREQUENCY: FREQUENCY: INTERMITTENT

## 2018-09-21 ASSESSMENT — PAIN SCALES - GENERAL: PAINLEVEL_OUTOF10: 2

## 2018-09-21 NOTE — PROGRESS NOTES
Occupational [address]   Rehabilitation Services  Occupational Therapy Treatment Note  Date: 18  Patient Name: Tuyet Zacarias    MRN: [id number]  Account: [de-identified]   : 1955  (64 y.o.) Gender: female     General  Additional Pertinent Hx: 2018 CLOSED REDUCTION PINNING LEFT INDEX FINGER . 2018  LEFT WRIST OPEN REDUCTION INTERNAL FIXATION    Referring Practitioner: Dr Yoselyn Lewis   Diagnosis: Closed colles fx of lt radius (ICD-10 code S52.532D),closed nondisplaced fx of neck of 2nd metacarpal bone lt hand (ICD-10 code-S62.361)  OT Visit Information  OT Insurance Information: Medical [place] [employer] Plus Plan  Total # of Visits Approved: 30  Total # of Visits to Date: 5  Progress Note Counter: Oct 4, 2018 pt will see MD  Subjective  Subjective: Pt states she saw MD yesterday and she will go back to work on . Pt states she showed the doctor that she is moving more her lt hand/wrist. Pt states she is to continue therapy. Pt states she is able to turn door handle to open door using lt hand now.    Pain Assessment  Patient Currently in Pain: Yes  Pain Assessment: 0-10  Pain Level: 2  Pain Type: Acute pain  Pain Location: Wrist, Finger (Comment which one)  Pain Orientation: Left  Pain Descriptors: Aching (Pt states a little achy and stiff today. )  Pain Frequency: Intermittent  Clinical Progression: Not changed  Patient's Stated Pain Goal: No pain        Wrist/Hand Exercises  Baps Board Screw And Unscrew Reps/Sets/Time: use lt UE 10x each way L5, L4,L3, L2, L 1  Other exercises  Other exercises 1: Issued fnger edema sleeves for lt (index,long, ring, little fingers)  Other exercises 2: PROM and edema massage lt wrist and hand  Other exercises 3: Lt hand finger blocking exercises : thumb IP, index, long, ring, little fingers (PIP and DIPs) 25x each , OT holds lt MPs flexed to 90 then pt flexes PIPs w DIPs extedned 25x  Other exercises 4: Red /beige pegs using lt hand place all 8 rows, then using lt hand flip over all pegs in 8 rows (alternate fingers w thumb each row)  Other exercises 5: juxaciser using lt UE move disc over and back 4 sets  Other exercises 8: lt hand 1 x 1\" cube stacking/unstacking 11 cubes using lt hand 4 sets   Other exercises 9: small cone using lt hand to push into putty 30x (holding wide end of cone), 30x (holding small end of cone)  Other exercises 10: lt hand place/remove graded clothespins - yellow light resistance, red medium resistance  Assessment  Performance deficits / Impairments: Decreased ROM, Decreased coordination, Decreased strength  Assessment: See OT exercises  for lt wrist/hand rom , coordination, and strengthening exercises. Begun AROM/tendon gliding exercise for lt hand w OT holding MPs in flexed position while pt flexes and extends PIPs w DIPs extended. Pt reports a little lt hand soreness during PROM lt wrist/hand. OT progressed pt to grasping/pinching lighter resistance graded clothespins to improve lt hand strength. See OT exercises for details.    Treatment Diagnosis: Lt hand/wrist weakness, stiffness, impaired coordination, pain, swelling  Prognosis: Good  REQUIRES OT FOLLOW UP: Yes  Discharge Recommendations: Outpatient OT           Plan  REQUIRES OT FOLLOW UP: Yes  Plan  Times per week: 4-5  Plan weeks: 6 weeks  Current Treatment Recommendations: ROM, Strengthening, Patient/Caregiver Education & Training (coordination)  Plan Comment: Continue OT  OT Individual Minutes  Time In: 3163  Time Out: 1007  Minutes: 52  Time Code Minutes   Timed Code Treatment Minutes: 52 Minutes    Electronically signed by Zuly Escamilla OT on 9/21/18 at 11:26 AM          Treatment Charges:  Minutes Units   Ultrasound     Electrical Stim     Iontophoresis     Paraffin      Massage     Eval     ADL      Ther Exercise 52 3   Ther Activities     Neuro Re-Ed     Splinting      Other     Total Treatment Time:  52

## 2018-09-24 ENCOUNTER — APPOINTMENT (OUTPATIENT)
Dept: OCCUPATIONAL THERAPY | Age: 63
End: 2018-09-24
Payer: COMMERCIAL

## 2018-09-26 ENCOUNTER — HOSPITAL ENCOUNTER (OUTPATIENT)
Dept: OCCUPATIONAL THERAPY | Age: 63
Setting detail: THERAPIES SERIES
Discharge: HOME | End: 2018-09-26
Payer: COMMERCIAL

## 2018-09-26 PROCEDURE — 97110 THERAPEUTIC EXERCISES: CPT

## 2018-09-26 ASSESSMENT — PAIN DESCRIPTION - DESCRIPTORS: DESCRIPTORS: SORE;TIGHTNESS

## 2018-09-26 ASSESSMENT — PAIN DESCRIPTION - PROGRESSION: CLINICAL_PROGRESSION: NOT CHANGED

## 2018-09-26 ASSESSMENT — PAIN SCALES - GENERAL: PAINLEVEL_OUTOF10: 2

## 2018-09-26 ASSESSMENT — PAIN DESCRIPTION - LOCATION: LOCATION: WRIST;HAND

## 2018-09-26 ASSESSMENT — PAIN DESCRIPTION - FREQUENCY: FREQUENCY: INTERMITTENT

## 2018-09-26 ASSESSMENT — PAIN DESCRIPTION - PAIN TYPE: TYPE: ACUTE PAIN

## 2018-09-27 ENCOUNTER — HOSPITAL ENCOUNTER (OUTPATIENT)
Dept: OCCUPATIONAL THERAPY | Age: 63
Setting detail: THERAPIES SERIES
Discharge: HOME | End: 2018-09-27
Payer: COMMERCIAL

## 2018-09-27 PROCEDURE — 97110 THERAPEUTIC EXERCISES: CPT

## 2018-09-27 NOTE — PROGRESS NOTES
[address]  Rehabilitation Services  Occupational Therapy Treatment Note  Date: 18  Patient Name: Tonie Young    MRN: [id number]  Account: [de-identified]   : 1955  (64 y.o.) Gender: female     General  Additional Pertinent Hx: 2018 CLOSED REDUCTION PINNING LEFT INDEX FINGER . 2018  LEFT WRIST OPEN REDUCTION INTERNAL FIXATION    Referring Practitioner: Dr Diogo Samano   Diagnosis: Closed colles fx of lt radius (ICD-10 code S52.532D),closed nondisplaced fx of neck of 2nd metacarpal bone lt hand (ICD-10 code-S62.361)  OT Visit Information  OT Insurance Information: Medical [place] [employer] Plus Plan  Total # of Visits Approved: 30  Total # of Visits to Date: 7  Progress Note Counter: Oct 4, 2018 pt will see MD  Subjective  Subjective: Pt reports that she is concerned that she is unable to lay her hand completely flat on table.   Pain Assessment  Patient Currently in Pain: Denies (No pain at start of session and minimal discomfort w/PROM)     Other exercises  Other exercises 1: HEP - orange putty  Other exercises 2: PROM and edema massage lt wrist and hand  Other exercises 4: Red /beige pegs using lt hand place all 8 rows, then using lt hand flip over all pegs in 8 rows (alternate fingers w thumb each row)  Other exercises 7: lt UE cone stacking/unstacking- move 11 cones over and back 1 set, move 11 cones stack/unstack for supination/pronation  Other exercises 8: lt hand 1 x 1\" cube stacking/unstacking 12 cubes using lt hand 2 sets   Other exercises 9: small cone using lt hand to push into putty 50x (holding wide end of cone), 0x (holding small end of cone)  Other exercises 10: lt hand place/remove graded clothespins - yellow light resistance, red medium resistance, green moderate resistance  Other exercises 11: yellow 1.5# digiflex lt hand gripping 25x, lt hand each finger(thumb, index, long, ring, little ) isolated pinching 25x  Other exercises 12: key pegs -use lt hand place all 25 w 3jaw pinch, then remove pegs using alternatings each finger oppositional pinch  Assessment  Performance deficits / Impairments: Decreased ROM, Decreased coordination, Decreased strength  Assessment: OT treatment focused on increasing ROM/strength/coordination and overal functional use of L hand - see OT exercise sheet for detailed report. Pt tolerated treatment well with minimal c/o discomfort. Added orange thera-putty to HEP with instruction to include extension exercises to promote increased extension of digits. Pt V/D good understanding.   Treatment Diagnosis: Lt hand/wrist weakness, stiffness, impaired coordination, pain, swelling  Prognosis: Good  Patient Education: HEP orange thera-putty  Barriers to Learning: None  REQUIRES OT FOLLOW UP: Yes  Discharge Recommendations: Outpatient OT     Plan  REQUIRES OT FOLLOW UP: Yes     OT Individual Minutes  Time In: 7508  Time Out: 1542  Minutes: 54  Time Code Minutes   Timed Code Treatment Minutes: 54 Minutes    Electronically signed by Elin Bear on 9/27/18 at 3:45 PM    Treatment Charges:  Minutes Units   Ultrasound     Electrical Stim     Iontophoresis     Paraffin      Massage     Eval     ADL      Ther Exercise 54 4   Ther Activities     Neuro Re-Ed     Splinting      Other     Total Treatment Time:  54 4

## 2018-09-28 ENCOUNTER — HOSPITAL ENCOUNTER (OUTPATIENT)
Dept: OCCUPATIONAL THERAPY | Age: 63
Setting detail: THERAPIES SERIES
Discharge: HOME | End: 2018-09-28
Payer: COMMERCIAL

## 2018-09-28 PROCEDURE — 97110 THERAPEUTIC EXERCISES: CPT

## 2018-09-28 NOTE — PROGRESS NOTES
resistance  Other exercises 11: yellow 1.5# digiflex lt hand gripping 25x, lt hand each finger(thumb, index, long, ring, little ) isolated pinching 25x  Other exercises 12: key pegs -use lt hand place all 25 w 3jaw pinch, then remove pegs using alternatings each finger oppositional pinch  Other exercises 13: Velcro roller #3 on small velcro strip - down and back x3  Other exercises 14: Yellow flex bar - U x15, upside down U x15, twist x15  Assessment  Performance deficits / Impairments: Decreased ROM, Decreased coordination, Decreased strength  Assessment: OT treatment fcoused on decreasing edema and increasing ROM/strength/coordination of L hand/wrist - see OT exercise sheet for detailed report. Pt reported at start of session that her fingers felt tight 2* having worked all day and not having any time to stretch. Pt tolerated treatment well with minimal c/o pain. Added velcro roller and flex bar exercises with Good tolerance.   Treatment Diagnosis: Lt hand/wrist weakness, stiffness, impaired coordination, pain, swelling  Prognosis: Good  REQUIRES OT FOLLOW UP: Yes  Discharge Recommendations: Outpatient OT     Plan  REQUIRES OT FOLLOW UP: Yes     OT Individual Minutes  Time In: 5040  Time Out: 4484  Minutes: 54  Time Code Minutes   Timed Code Treatment Minutes: 54 Minutes    Electronically signed by Dre Manrique on 9/28/18 at 3:49 PM    Treatment Charges:  Minutes Units   Ultrasound     Electrical Stim     Iontophoresis     Paraffin      Massage     Eval     ADL      Ther Exercise 54 4   Ther Activities     Neuro Re-Ed     Splinting      Other     Total Treatment Time:  54 4

## 2018-10-01 ENCOUNTER — HOSPITAL ENCOUNTER (OUTPATIENT)
Dept: OCCUPATIONAL THERAPY | Age: 63
Setting detail: THERAPIES SERIES
Discharge: HOME | End: 2018-10-01
Payer: COMMERCIAL

## 2018-10-01 PROCEDURE — 97110 THERAPEUTIC EXERCISES: CPT

## 2018-10-01 ASSESSMENT — 9 HOLE PEG TEST
TEST_RESULT: FUNCTIONAL
TESTTIME_SECONDS: 21

## 2018-10-01 NOTE — PROGRESS NOTES
hand place/remove graded clothespins - yellow light resistance, red medium resistance, green moderate resistance, and blue heavy resistance  Other exercises 12: key pegs -use lt hand place all 25 w 3jaw pinch, then remove pegs using alternatings each finger oppositional pinch  Other exercises 13: Velcro roller #3 on small velcro strip - down and back x5  Other exercises 14: Yellow flex bar - U x15, upside down U x15, twist x15  Assessment  Performance deficits / Impairments: Decreased ROM, Decreased coordination, Decreased strength  Assessment: OT treatment focused on increasing ROM/strength/coordination of L hand/wrist - see OT exercise sheet for detailed report.   Measurements also taken with pt demonstrating improved AROM of wrist and all fingers as well as increased /pinch strength and coordination AEB 9 hole peg test.    Treatment Diagnosis: Lt hand/wrist weakness, stiffness, impaired coordination, pain, swelling  Prognosis: Good  REQUIRES OT FOLLOW UP: Yes  Discharge Recommendations: Outpatient OT    Plan  REQUIRES OT FOLLOW UP: Yes     OT Individual Minutes  Time In: 1521  Time Out: 1601  Minutes: 40  Time Code Minutes   Timed Code Treatment Minutes: 30 Minutes    Electronically signed by Carisa Nava on 10/1/2018 at 4:08 PM    Treatment Charges:  Minutes Units   Ultrasound     Electrical Stim     Iontophoresis     Paraffin      Massage     Eval 10 (re-eval) 0   ADL      Ther Exercise 30 2   Ther Activities     Neuro Re-Ed     Splinting      Other     Total Treatment Time:  40 2

## 2018-10-03 ENCOUNTER — HOSPITAL ENCOUNTER (OUTPATIENT)
Dept: OCCUPATIONAL THERAPY | Age: 63
Setting detail: THERAPIES SERIES
Discharge: HOME | End: 2018-10-03
Payer: COMMERCIAL

## 2018-10-03 PROCEDURE — 97110 THERAPEUTIC EXERCISES: CPT

## 2018-10-04 ENCOUNTER — OFFICE VISIT (OUTPATIENT)
Dept: ORTHOPEDIC SURGERY | Age: 63
End: 2018-10-04

## 2018-10-04 ENCOUNTER — HOSPITAL ENCOUNTER (OUTPATIENT)
Dept: OCCUPATIONAL THERAPY | Age: 63
Setting detail: THERAPIES SERIES
Discharge: HOME | End: 2018-10-04
Payer: COMMERCIAL

## 2018-10-04 DIAGNOSIS — M80.00XD AGE-RELATED OSTEOPOROSIS WITH CURRENT PATHOLOGICAL FRACTURE WITH ROUTINE HEALING, SUBSEQUENT ENCOUNTER: ICD-10-CM

## 2018-10-04 DIAGNOSIS — M75.42 IMPINGEMENT SYNDROME OF LEFT SHOULDER: ICD-10-CM

## 2018-10-04 DIAGNOSIS — S62.361D CLOSED NONDISPLACED FRACTURE OF NECK OF SECOND METACARPAL BONE OF LEFT HAND WITH ROUTINE HEALING, SUBSEQUENT ENCOUNTER: ICD-10-CM

## 2018-10-04 DIAGNOSIS — M25.512 ACUTE PAIN OF LEFT SHOULDER: ICD-10-CM

## 2018-10-04 DIAGNOSIS — S52.532D CLOSED COLLES' FRACTURE OF LEFT RADIUS WITH ROUTINE HEALING, SUBSEQUENT ENCOUNTER: Primary | ICD-10-CM

## 2018-10-04 PROCEDURE — 97110 THERAPEUTIC EXERCISES: CPT

## 2018-10-04 PROCEDURE — 99024 POSTOP FOLLOW-UP VISIT: CPT | Performed by: ORTHOPAEDIC SURGERY

## 2018-10-07 ENCOUNTER — ANESTHESIA EVENT (OUTPATIENT)
Dept: OPERATING ROOM | Age: 63
End: 2018-10-07
Payer: COMMERCIAL

## 2018-10-08 ENCOUNTER — HOSPITAL ENCOUNTER (OUTPATIENT)
Age: 63
Setting detail: OUTPATIENT SURGERY
Discharge: HOME | End: 2018-10-08
Attending: ORTHOPAEDIC SURGERY | Admitting: ORTHOPAEDIC SURGERY
Payer: COMMERCIAL

## 2018-10-08 ENCOUNTER — ANESTHESIA (OUTPATIENT)
Dept: OPERATING ROOM | Age: 63
End: 2018-10-08
Payer: COMMERCIAL

## 2018-10-08 VITALS — DIASTOLIC BLOOD PRESSURE: 86 MMHG | SYSTOLIC BLOOD PRESSURE: 142 MMHG | OXYGEN SATURATION: 94 %

## 2018-10-08 VITALS
DIASTOLIC BLOOD PRESSURE: 92 MMHG | HEIGHT: 62 IN | BODY MASS INDEX: 33.13 KG/M2 | WEIGHT: 180 LBS | RESPIRATION RATE: 16 BRPM | HEART RATE: 82 BPM | TEMPERATURE: 98.5 F | OXYGEN SATURATION: 94 % | SYSTOLIC BLOOD PRESSURE: 155 MMHG

## 2018-10-08 DIAGNOSIS — S62.361D CLOSED NONDISPLACED FRACTURE OF NECK OF SECOND METACARPAL BONE OF LEFT HAND WITH ROUTINE HEALING, SUBSEQUENT ENCOUNTER: Primary | ICD-10-CM

## 2018-10-08 PROCEDURE — 3700000001 HC ADD 15 MINUTES (ANESTHESIA): Performed by: ORTHOPAEDIC SURGERY

## 2018-10-08 PROCEDURE — 7100000001 HC PACU RECOVERY - ADDTL 15 MIN: Performed by: ORTHOPAEDIC SURGERY

## 2018-10-08 PROCEDURE — 2580000003 HC RX 258: Performed by: ANESTHESIOLOGY

## 2018-10-08 PROCEDURE — 7100000031 HC ASPR PHASE II RECOVERY - ADDTL 15 MIN: Performed by: ORTHOPAEDIC SURGERY

## 2018-10-08 PROCEDURE — 6360000002 HC RX W HCPCS: Performed by: ANESTHESIOLOGY

## 2018-10-08 PROCEDURE — 3700000000 HC ANESTHESIA ATTENDED CARE: Performed by: ORTHOPAEDIC SURGERY

## 2018-10-08 PROCEDURE — 3600000012 HC SURGERY LEVEL 2 ADDTL 15MIN: Performed by: ORTHOPAEDIC SURGERY

## 2018-10-08 PROCEDURE — 3600000002 HC SURGERY LEVEL 2 BASE: Performed by: ORTHOPAEDIC SURGERY

## 2018-10-08 PROCEDURE — 25259 MANIPULATE WRIST W/ANESTHES: CPT | Performed by: ORTHOPAEDIC SURGERY

## 2018-10-08 PROCEDURE — 7100000000 HC PACU RECOVERY - FIRST 15 MIN: Performed by: ORTHOPAEDIC SURGERY

## 2018-10-08 PROCEDURE — 7100000030 HC ASPR PHASE II RECOVERY - FIRST 15 MIN: Performed by: ORTHOPAEDIC SURGERY

## 2018-10-08 RX ORDER — DIPHENHYDRAMINE HYDROCHLORIDE 50 MG/ML
12.5 INJECTION INTRAMUSCULAR; INTRAVENOUS
Status: DISCONTINUED | OUTPATIENT
Start: 2018-10-08 | End: 2018-10-08 | Stop reason: HOSPADM

## 2018-10-08 RX ORDER — ACETAMINOPHEN 500 MG
500 TABLET ORAL EVERY 6 HOURS PRN
COMMUNITY
End: 2018-11-13 | Stop reason: ALTCHOICE

## 2018-10-08 RX ORDER — LIDOCAINE HYDROCHLORIDE 10 MG/ML
1 INJECTION, SOLUTION EPIDURAL; INFILTRATION; INTRACAUDAL; PERINEURAL
Status: DISCONTINUED | OUTPATIENT
Start: 2018-10-08 | End: 2018-10-08 | Stop reason: HOSPADM

## 2018-10-08 RX ORDER — MIDAZOLAM HYDROCHLORIDE 1 MG/ML
INJECTION INTRAMUSCULAR; INTRAVENOUS PRN
Status: DISCONTINUED | OUTPATIENT
Start: 2018-10-08 | End: 2018-10-08 | Stop reason: SDUPTHER

## 2018-10-08 RX ORDER — OXYCODONE HYDROCHLORIDE AND ACETAMINOPHEN 5; 325 MG/1; MG/1
1 TABLET ORAL
Status: DISCONTINUED | OUTPATIENT
Start: 2018-10-08 | End: 2018-10-08 | Stop reason: HOSPADM

## 2018-10-08 RX ORDER — SODIUM CHLORIDE 0.9 % (FLUSH) 0.9 %
10 SYRINGE (ML) INJECTION EVERY 12 HOURS SCHEDULED
Status: DISCONTINUED | OUTPATIENT
Start: 2018-10-08 | End: 2018-10-08 | Stop reason: HOSPADM

## 2018-10-08 RX ORDER — PROPOFOL 10 MG/ML
INJECTION, EMULSION INTRAVENOUS PRN
Status: DISCONTINUED | OUTPATIENT
Start: 2018-10-08 | End: 2018-10-08 | Stop reason: SDUPTHER

## 2018-10-08 RX ORDER — HYDROCODONE BITARTRATE AND ACETAMINOPHEN 5; 325 MG/1; MG/1
1 TABLET ORAL EVERY 4 HOURS PRN
Qty: 28 TABLET | Refills: 0 | Status: SHIPPED | OUTPATIENT
Start: 2018-10-08 | End: 2018-10-15

## 2018-10-08 RX ORDER — FENTANYL CITRATE 50 UG/ML
INJECTION, SOLUTION INTRAMUSCULAR; INTRAVENOUS PRN
Status: DISCONTINUED | OUTPATIENT
Start: 2018-10-08 | End: 2018-10-08 | Stop reason: SDUPTHER

## 2018-10-08 RX ORDER — SODIUM CHLORIDE 0.9 % (FLUSH) 0.9 %
10 SYRINGE (ML) INJECTION PRN
Status: DISCONTINUED | OUTPATIENT
Start: 2018-10-08 | End: 2018-10-08 | Stop reason: HOSPADM

## 2018-10-08 RX ORDER — DEXAMETHASONE SODIUM PHOSPHATE 4 MG/ML
INJECTION, SOLUTION INTRA-ARTICULAR; INTRALESIONAL; INTRAMUSCULAR; INTRAVENOUS; SOFT TISSUE PRN
Status: DISCONTINUED | OUTPATIENT
Start: 2018-10-08 | End: 2018-10-08 | Stop reason: SDUPTHER

## 2018-10-08 RX ORDER — SODIUM CHLORIDE, SODIUM LACTATE, POTASSIUM CHLORIDE, CALCIUM CHLORIDE 600; 310; 30; 20 MG/100ML; MG/100ML; MG/100ML; MG/100ML
INJECTION, SOLUTION INTRAVENOUS CONTINUOUS
Status: DISCONTINUED | OUTPATIENT
Start: 2018-10-08 | End: 2018-10-08 | Stop reason: HOSPADM

## 2018-10-08 RX ORDER — MORPHINE SULFATE 2 MG/ML
2 INJECTION, SOLUTION INTRAMUSCULAR; INTRAVENOUS EVERY 5 MIN PRN
Status: DISCONTINUED | OUTPATIENT
Start: 2018-10-08 | End: 2018-10-08 | Stop reason: HOSPADM

## 2018-10-08 RX ORDER — ONDANSETRON 2 MG/ML
4 INJECTION INTRAMUSCULAR; INTRAVENOUS
Status: DISCONTINUED | OUTPATIENT
Start: 2018-10-08 | End: 2018-10-08 | Stop reason: HOSPADM

## 2018-10-08 RX ADMIN — FENTANYL CITRATE 100 MCG: 50 INJECTION, SOLUTION INTRAMUSCULAR; INTRAVENOUS at 12:05

## 2018-10-08 RX ADMIN — MIDAZOLAM 2 MG: 1 INJECTION INTRAMUSCULAR; INTRAVENOUS at 12:03

## 2018-10-08 RX ADMIN — MORPHINE SULFATE 2 MG: 2 INJECTION, SOLUTION INTRAMUSCULAR; INTRAVENOUS at 12:25

## 2018-10-08 RX ADMIN — SODIUM CHLORIDE, POTASSIUM CHLORIDE, SODIUM LACTATE AND CALCIUM CHLORIDE: 600; 310; 30; 20 INJECTION, SOLUTION INTRAVENOUS at 10:41

## 2018-10-08 RX ADMIN — PROPOFOL 150 MG: 10 INJECTION, EMULSION INTRAVENOUS at 12:05

## 2018-10-08 RX ADMIN — DEXAMETHASONE SODIUM PHOSPHATE 8 MG: 4 INJECTION, SOLUTION INTRAMUSCULAR; INTRAVENOUS at 12:13

## 2018-10-08 ASSESSMENT — PULMONARY FUNCTION TESTS
PIF_VALUE: 1
PIF_VALUE: 17
PIF_VALUE: 2
PIF_VALUE: 18
PIF_VALUE: 23
PIF_VALUE: 1
PIF_VALUE: 14

## 2018-10-08 ASSESSMENT — PAIN SCALES - GENERAL
PAINLEVEL_OUTOF10: 6
PAINLEVEL_OUTOF10: 6

## 2018-10-08 ASSESSMENT — PAIN - FUNCTIONAL ASSESSMENT: PAIN_FUNCTIONAL_ASSESSMENT: 0-10

## 2018-10-08 ASSESSMENT — ENCOUNTER SYMPTOMS: STRIDOR: 0

## 2018-10-08 NOTE — ANESTHESIA POSTPROCEDURE EVALUATION
POST- ANESTHESIA EVALUATION       Pt Name: Fernanda Edgar  MRN: [id number]  YOB: 1955  Date of evaluation: 10/8/2018  Time:  1:30 PM      BP (!) 155/92   Pulse 82   Temp 98.5 °F (36.9 °C) (Temporal)   Resp 16   Ht 5' 2\" (1.575 m)   Wt 180 lb (81.6 kg)   SpO2 94%   BMI 32.92 kg/m²      Consciousness Level  Awake  Cardiopulmonary Status  Stable  Pain Adequately Treated YES  Nausea / Vomiting  NO  Adequate Hydration  YES  Anesthesia Related Complications NONE      Electronically signed by Shira Owens MD on 10/8/2018 at 1:30 PM       Department of Anesthesiology  Postprocedure Note    Patient: Fernanda Edgar  MRN: [id number]  YOB: 1955  Date of evaluation: 10/8/2018  Time:  1:29 PM     Procedure Summary     Date:  10/08/18 Room / Location:  [address] [place] [address] Kait Howell 01 / 133[facility] Kait [facility]ell    Anesthesia Start:  1202 Anesthesia Stop:  1222    Procedure:  SHOULDER MANIPULATION LEFT  & ALL FINGERS LEFT HAND (Left Shoulder) Diagnosis:  (LEFT SHOULDER & LEFT HAND STIFFNESS   PAT ON ADMIT PER ANES )    Surgeon:  Carlo Florence MD Responsible Provider:  Shira Owens MD    Anesthesia Type:  general ASA Status:  1          Anesthesia Type: general    Marlena Phase I: Marlena Score: 10    Marlena Phase II: Marlena Score: 10    Last vitals: Reviewed and per EMR flowsheets.        Anesthesia Post Evaluation

## 2018-10-08 NOTE — H&P
HISTORY and [address]       NAME:  Panchito Pérez  MRN: [id number]   YOB: 1955   Date: 10/8/2018   Age: 58 y.o. Gender: female       COMPLAINT AND PRESENT HISTORY:   HPI   aPnchito Pérez is 58 y.o.,  female, undergoing preadmission testing for left shoulder, Lt Fingers adhesive capsulitis. patient fell Fx her Lt forearm in July 2018. Had ORIF 08/01/2018. Pt has pain with residual Limitation in the ROM in the Lt fingers and Lt shoulder  Pt C/O of pain, stiffness, and limitation in the range of motion (Abduction to 90 Degrees). .  Pt denies any other symptoms. No redness, swelling or rashes. PAST MEDICAL HISTORY     Past Medical History:   Diagnosis Date    Forearm fracture     Lt     SURGICAL HISTORY       Past Surgical History:   Procedure Laterality Date    FINGER CLOSED REDUCTION Left 8/1/2018    CLOSED REDUCTION PINNING INDEX FINGER performed by Angela Zuniga MD at [address] [place] [address] Bilateral     arthroscopy    LAPAROSCOPY      tubal    OPEN TX CARPAL SCAPHOID NAVICULAR FRACTURE Left 8/1/2018    WRIST OPEN REDUCTION INTERNAL FIXATION performed by Angela Zuniga MD at [address] [place] [address]       Family History   Problem Relation Age of Onset    Diabetes Father     Hypertension Father     Diabetes Mother     Hypertension Mother        SOCIAL HISTORY       Social History     Social History    Marital status:      Spouse name: N/A    Number of children: N/A    Years of education: N/A     Social History Main Topics    Smoking status: Never Smoker    Smokeless tobacco: Never Used    Alcohol use No    Drug use: No    Sexual activity: Yes     Partners: Male     Other Topics Concern    None     Social History Narrative    None           REVIEW OF SYSTEMS      No Known Allergies    No current facility-administered medications on file prior to encounter.       Current Outpatient Prescriptions on File Prior to Encounter   Medication S1 > S2. No audible murmurs or gallops. LUNGS:  Equal on expansion, normal breath sounds. No adventitious sounds. ABDOMEN:  Obese. Soft on palpation. No localized tenderness. No guarding or rigidity. No palpable hepatosplenomegaly. LYMPHATICS:  No palpable cervical lymphadenopathy. LOCOMOTOR, BACK AND SPINE:  No tenderness or deformities. EXTREMITIES:  Symmetrical, no pretibial edema. left shoulder tenderness, limitation in the range of motion(Abduction to 90 Degrees). Limitation in the ROM in the Lt fingers   Bettys sign negative. No discoloration or ulcerations. NEUROLOGIC:  The patient is conscious, alert, oriented, No apparent focal sensory or motor deficits. PROVISIONAL DIAGNOSES / SURGERY:      left shoulder adhesive capsulitis,  Limitation in the ROM in the Lt fingers . left shoulder manipulation.   LEFT SHOULDER MANIPULATION & ALL FINGERS LEFT HAND    Patient Active Problem List    Diagnosis Date Noted    Fracture, Colles, left, closed 07/30/2018    Closed nondisplaced fracture of neck of second metacarpal bone of left hand 07/30/2018    Age-related osteoporosis with current pathological fracture 07/30/2018           JANETTE GARNER PA-C on 10/8/2018 at 11:02 AM

## 2018-10-08 NOTE — BRIEF OP NOTE
Brief Postoperative Note    Kumar Lew  YOB: 1955  [id number]    Pre-operative Diagnosis: left shoulder pain post fall; left hand finger stiffness post surgical treatment left wrist and if mc neck fractures    Post-operative Diagnosis: Same    Procedure: exam left shoulder under anesthesia; manipulation left hand under anesthesia    Anesthesia: General    Surgeons/Assistants: Valeria    Estimated Blood Loss: 0    Complications: None    Specimens: Was Not Obtained    Findings: see dictation      Electronically signed by Stormy Davenport MD on 10/8/2018 at 12:17 PM

## 2018-10-09 NOTE — OP NOTE
[address] [facility]                [facility], [address]                               OPERATIVE REPORT    PATIENT NAME: Laura Valera                       :         1955  MED REC NO:   [id number]                              ROOM:  ACCOUNT NO:   [de-identified]                           ADMIT DATE:  10/08/2018  PROVIDER:     Suzanne Cheatham    DATE OF PROCEDURE:  10/08/2018    PREOPERATIVE DIAGNOSES:  1. Left hand stiffness postsurgical treatment, index finger  metacarpal neck fracture, and Colles fracture. 2.  Left shoulder pain post fall when the patient suffered the above  injury. POSTOPERATIVE DIAGNOSES:  1. Left hand stiffness postsurgical treatment, index finger  metacarpal neck fracture, and Colles fracture. 2.  Left shoulder pain post fall when the patient suffered the above  injury. PROCEDURE PERFORMED:  1. Manipulation under anesthesia, left hand. 2.  Examination of the left shoulder under anesthesia. FINDINGS:  1. Left shoulder exam normal.  2.  The left hand was manipulated under anesthesia. No barbara release  of adhesions, but gradually the patient's motion did improve. Initially, we were unable to get her fingers to her palm, but  post-manipulation, her fingers were with only modest pressure able to  get into the palm. PROCEDURE IN DETAIL:  The patient was taken to the operating room,  placed under general anesthesia. Examination of the left shoulder,  gravity allowed full elevation external and internal rotation. Absolutely, no signs of adhesive capsulitis. The shoulder was  examined as we were going to have the patient asleep already for the  hand. Manipulation of the hand subsequently progressed. Initially, we  worked on stretching the MP joints alone. Index finger being quite  tight as we were unable to pin this in full position _____ due to the  pins having tethered the collateral ligaments a little bit.   We

## 2018-10-10 ENCOUNTER — HOSPITAL ENCOUNTER (OUTPATIENT)
Dept: OCCUPATIONAL THERAPY | Age: 63
Setting detail: THERAPIES SERIES
Discharge: HOME | End: 2018-10-10
Payer: COMMERCIAL

## 2018-10-10 PROCEDURE — 97110 THERAPEUTIC EXERCISES: CPT

## 2018-10-11 ENCOUNTER — HOSPITAL ENCOUNTER (OUTPATIENT)
Dept: OCCUPATIONAL THERAPY | Age: 63
Setting detail: THERAPIES SERIES
Discharge: HOME | End: 2018-10-11
Payer: COMMERCIAL

## 2018-10-11 PROCEDURE — 97110 THERAPEUTIC EXERCISES: CPT

## 2018-10-12 ENCOUNTER — HOSPITAL ENCOUNTER (OUTPATIENT)
Dept: OCCUPATIONAL THERAPY | Age: 63
Setting detail: THERAPIES SERIES
Discharge: HOME | End: 2018-10-12
Payer: COMMERCIAL

## 2018-10-12 PROCEDURE — 97110 THERAPEUTIC EXERCISES: CPT

## 2018-10-12 NOTE — PROGRESS NOTES
Occupational [address]  Occupational Therapy Treatment Note  Date: 10/12/18  Patient Name: Fernanda Edgar      MRN: [id number]  Account: [de-identified]   : 1955  (64 y.o.)  Gender: female   Referring Practitioner: Dr Shahida Calderon   Diagnosis: Closed colles fx of lt radius (ICD-10 code S52.532D),closed nondisplaced fx of neck of 2nd metacarpal bone lt hand (ICD-10 code-S62.361)  Additional Pertinent Hx: 2018 CLOSED REDUCTION PINNING LEFT INDEX FINGER . 2018  LEFT WRIST OPEN REDUCTION INTERNAL FIXATION  . 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 LUE AROM (degrees)  L Wrist Flexion 0-80: 45 - decrease   L Wrist Extension 0-70: 55 increase   L Wrist Radial Deviation 0-20: 20- no change  L Wrist Ulnar Deviation 0-45: 25- increase     Left Hand AROM (degrees)  Left Hand General AROM: Flexion to [facility] : index  4.5 cm,long 3.5 cm ,ring 4. cm ,  little finger in stack splint. Improving rom long and ring fingers. Measurement taken prior to stretching/PROM  L Index  MCP 0-90: flexion 57 - no change  L Index PIP 0-100: flexion  75 - increase  L Index DIP 0-70: flexion  35-   L Long  MCP 0-90: 70- -no change  L Long PIP 0-100: flexion  85 - increase  L Long DIP 0-70: flexion  40 - increase  L Ring  MCP 0-90: flexion  70   L Ring PIP 0-100: flexion  85- increase  L Ring DIP 0-70: flexion  20 - no change  L Little PIP 0-100: stack splint  L Little DIP 0-70: stack splint  LUE Edema - Circumference (cm)  L Index PIP: 7.2 cm  swelling  L Index DIP: 5.5 cm swelling     Other exercises  Other exercises 2: PROM, joint mobilization, and edema massage lt wrist and hand thumb, index, long, ring fingers. lt little finger not done - stack splint on it.   Other exercises 3: Lt

## 2018-10-13 ENCOUNTER — HOSPITAL ENCOUNTER (OUTPATIENT)
Dept: MRI IMAGING | Age: 63
Discharge: HOME | End: 2018-10-15
Payer: COMMERCIAL

## 2018-10-13 DIAGNOSIS — M75.42 IMPINGEMENT SYNDROME OF LEFT SHOULDER: ICD-10-CM

## 2018-10-13 DIAGNOSIS — M25.512 ACUTE PAIN OF LEFT SHOULDER: ICD-10-CM

## 2018-10-13 PROCEDURE — 73221 MRI JOINT UPR EXTREM W/O DYE: CPT

## 2018-10-15 ENCOUNTER — HOSPITAL ENCOUNTER (OUTPATIENT)
Dept: OCCUPATIONAL THERAPY | Age: 63
Setting detail: THERAPIES SERIES
Discharge: HOME | End: 2018-10-15
Payer: COMMERCIAL

## 2018-10-15 PROCEDURE — 97110 THERAPEUTIC EXERCISES: CPT

## 2018-10-15 ASSESSMENT — PAIN SCALES - GENERAL: PAINLEVEL_OUTOF10: 2

## 2018-10-15 ASSESSMENT — PAIN DESCRIPTION - DESCRIPTORS: DESCRIPTORS: TIGHTNESS;SORE

## 2018-10-15 ASSESSMENT — PAIN DESCRIPTION - PROGRESSION: CLINICAL_PROGRESSION: NOT CHANGED

## 2018-10-15 ASSESSMENT — PAIN DESCRIPTION - FREQUENCY: FREQUENCY: INTERMITTENT

## 2018-10-15 ASSESSMENT — PAIN DESCRIPTION - LOCATION: LOCATION: WRIST;HAND

## 2018-10-15 ASSESSMENT — PAIN DESCRIPTION - ORIENTATION: ORIENTATION: LEFT

## 2018-10-15 ASSESSMENT — PAIN DESCRIPTION - PAIN TYPE: TYPE: ACUTE PAIN

## 2018-10-17 ENCOUNTER — HOSPITAL ENCOUNTER (OUTPATIENT)
Dept: OCCUPATIONAL THERAPY | Age: 63
Setting detail: THERAPIES SERIES
Discharge: HOME | End: 2018-10-17
Payer: COMMERCIAL

## 2018-10-17 PROCEDURE — 97110 THERAPEUTIC EXERCISES: CPT

## 2018-10-17 ASSESSMENT — PAIN DESCRIPTION - FREQUENCY: FREQUENCY: INTERMITTENT

## 2018-10-17 ASSESSMENT — PAIN DESCRIPTION - PAIN TYPE: TYPE: ACUTE PAIN

## 2018-10-17 ASSESSMENT — PAIN DESCRIPTION - LOCATION: LOCATION: HAND

## 2018-10-17 ASSESSMENT — PAIN SCALES - GENERAL: PAINLEVEL_OUTOF10: 4

## 2018-10-17 ASSESSMENT — PAIN DESCRIPTION - ORIENTATION: ORIENTATION: LEFT

## 2018-10-17 ASSESSMENT — PAIN DESCRIPTION - DESCRIPTORS: DESCRIPTORS: TIGHTNESS;SORE

## 2018-10-17 NOTE — PROGRESS NOTES
flexion/extension & pt correctly demonstrates this.    Barriers to Learning: none  REQUIRES OT FOLLOW UP: Yes  Discharge Recommendations: Outpatient OT           Plan  REQUIRES OT FOLLOW UP: Yes  Plan  Times per week: 4-5  Plan weeks: 6 weeks  Current Treatment Recommendations: ROM, Strengthening, Patient/Caregiver Education & Training  Plan Comment: Continue OT  OT Individual Minutes  Time In: 6301  Time Out: 1400  Minutes: 55  Time Code Minutes   Timed Code Treatment Minutes: 52 Minutes    Electronically signed by John Sandoval OT on 10/17/18 at 3:08 PM          Treatment Charges:  Minutes Units   Ultrasound     Electrical Stim     Iontophoresis     Paraffin      Massage     Eval     ADL      Ther Exercise 52 3   Ther Activities     Neuro Re-Ed     Splinting      Other - coban wrap flexion stretch (index,long - lt hand) 3 0   Total Treatment Time:  55

## 2018-10-18 ENCOUNTER — HOSPITAL ENCOUNTER (OUTPATIENT)
Dept: OCCUPATIONAL THERAPY | Age: 63
Setting detail: THERAPIES SERIES
Discharge: HOME | End: 2018-10-18
Payer: COMMERCIAL

## 2018-10-18 PROCEDURE — 97110 THERAPEUTIC EXERCISES: CPT

## 2018-10-18 ASSESSMENT — PAIN DESCRIPTION - FREQUENCY: FREQUENCY: INTERMITTENT

## 2018-10-18 ASSESSMENT — PAIN SCALES - GENERAL: PAINLEVEL_OUTOF10: 1

## 2018-10-18 ASSESSMENT — PAIN DESCRIPTION - PAIN TYPE: TYPE: ACUTE PAIN

## 2018-10-18 ASSESSMENT — 9 HOLE PEG TEST: TESTTIME_SECONDS: 23

## 2018-10-18 ASSESSMENT — PAIN DESCRIPTION - PROGRESSION: CLINICAL_PROGRESSION: GRADUALLY IMPROVING

## 2018-10-18 ASSESSMENT — PAIN DESCRIPTION - LOCATION: LOCATION: FINGER (COMMENT WHICH ONE)

## 2018-10-18 ASSESSMENT — PAIN DESCRIPTION - DESCRIPTORS: DESCRIPTORS: SORE;TIGHTNESS

## 2018-10-18 ASSESSMENT — PAIN DESCRIPTION - ORIENTATION: ORIENTATION: LEFT

## 2018-10-19 ENCOUNTER — HOSPITAL ENCOUNTER (OUTPATIENT)
Dept: OCCUPATIONAL THERAPY | Age: 63
Setting detail: THERAPIES SERIES
Discharge: HOME | End: 2018-10-19
Payer: COMMERCIAL

## 2018-10-19 PROCEDURE — 97110 THERAPEUTIC EXERCISES: CPT

## 2018-10-22 ENCOUNTER — OFFICE VISIT (OUTPATIENT)
Dept: ORTHOPEDIC SURGERY | Age: 63
End: 2018-10-22

## 2018-10-22 ENCOUNTER — HOSPITAL ENCOUNTER (OUTPATIENT)
Dept: OCCUPATIONAL THERAPY | Age: 63
Setting detail: THERAPIES SERIES
Discharge: HOME | End: 2018-10-22
Payer: COMMERCIAL

## 2018-10-22 DIAGNOSIS — M20.012 MALLET FINGER OF LEFT HAND: ICD-10-CM

## 2018-10-22 DIAGNOSIS — M80.00XD AGE-RELATED OSTEOPOROSIS WITH CURRENT PATHOLOGICAL FRACTURE WITH ROUTINE HEALING, SUBSEQUENT ENCOUNTER: Primary | ICD-10-CM

## 2018-10-22 DIAGNOSIS — S62.361D CLOSED NONDISPLACED FRACTURE OF NECK OF SECOND METACARPAL BONE OF LEFT HAND WITH ROUTINE HEALING, SUBSEQUENT ENCOUNTER: ICD-10-CM

## 2018-10-22 PROCEDURE — 97110 THERAPEUTIC EXERCISES: CPT

## 2018-10-22 PROCEDURE — 99024 POSTOP FOLLOW-UP VISIT: CPT | Performed by: ORTHOPAEDIC SURGERY

## 2018-10-22 RX ORDER — IBUPROFEN 800 MG/1
800 TABLET ORAL EVERY 8 HOURS PRN
Qty: 120 TABLET | Refills: 0 | Status: ON HOLD | OUTPATIENT
Start: 2018-10-22 | End: 2019-01-21 | Stop reason: HOSPADM

## 2018-10-22 ASSESSMENT — PAIN DESCRIPTION - ORIENTATION: ORIENTATION: LEFT

## 2018-10-22 ASSESSMENT — PAIN DESCRIPTION - DESCRIPTORS: DESCRIPTORS: TIGHTNESS;SORE

## 2018-10-22 ASSESSMENT — PAIN DESCRIPTION - PROGRESSION: CLINICAL_PROGRESSION: NOT CHANGED

## 2018-10-22 ASSESSMENT — PAIN DESCRIPTION - PAIN TYPE: TYPE: ACUTE PAIN

## 2018-10-22 ASSESSMENT — PAIN DESCRIPTION - FREQUENCY: FREQUENCY: INTERMITTENT

## 2018-10-22 ASSESSMENT — PAIN SCALES - GENERAL: PAINLEVEL_OUTOF10: 1

## 2018-10-22 ASSESSMENT — PAIN DESCRIPTION - LOCATION: LOCATION: HAND;WRIST

## 2018-10-22 NOTE — PROGRESS NOTES
little finger DIP not done - stack splint on it. Other exercises 3: Lt hand finger blocking exercises : thumb IP, index, long, ring, fingers (PIP and DIPs) 25x each  2 sets, OT holds lt MPs flexed to 90 then pt flexes PIPs w DIPs extedned 25x (not little finger)  Other exercises 9: small cone using lt hand to push into putty 50x (holding wide end of cone), 0x (holding small end of cone)  Other exercises 10: lt hand place/remove graded clothespins - yellow - min resistance,  red medium resistance, green moderate resistance, and blue heavy resistance (pt uses thumb , index, long fingers for 3 jaw pinch)  Other exercises 11: red 3# digiflex  lt hand gripping 25x, yellow 1.5# digiflex lt hand - each finger(thumb, index, long, ring,) isolated pinching 25x  Other exercises 12: key pegs -use lt hand place all 25 w 3jaw pinch, then remove pegs using alternating each finger(index, long, ring) oppositional pinch  Other exercises 13: Velcro roller #3 on large velcro strip - down and back x 10, roller #2 for flexion/extension up/back 10x  Other exercises 17: valpar 4 nuts/bolts - use lt hand to unscrew/screw (8 )- 1\" diameter nuts onto/off bolt  Assessment  Performance deficits / Impairments: Decreased ROM, Decreased coordination, Decreased strength  Assessment: OT upgraded HEP for lt hand. OT completed massage, joint mobilization, PROM, tendon gliding exercises for lt hand/wrist . Lt little finger only PROM MP, AROM PIP w stack splint on. See OT exercises for lt hand/wrist strengthening & coordination exercises. Treatment Diagnosis: Lt hand/wrist weakness, stiffness, impaired coordination, pain, swelling  Prognosis: Good  Patient Education: HEP - OT instructed pt in lt hand exercise to improve lt hand flexion : pt grasping marker that is 3/4\" diameter at 1 end and 1/2\" diameter at other end. AROM exercise w pt using lt index, long, ring fingers to touch palm,then pt flex fingers pulling them back toward the [facility].  Pt able to

## 2018-10-23 DIAGNOSIS — S62.92XD: Primary | ICD-10-CM

## 2018-10-24 ENCOUNTER — HOSPITAL ENCOUNTER (OUTPATIENT)
Dept: OCCUPATIONAL THERAPY | Age: 63
Setting detail: THERAPIES SERIES
Discharge: HOME | End: 2018-10-24
Payer: COMMERCIAL

## 2018-10-24 PROCEDURE — 97110 THERAPEUTIC EXERCISES: CPT

## 2018-10-24 ASSESSMENT — PAIN SCALES - GENERAL: PAINLEVEL_OUTOF10: 3

## 2018-10-24 ASSESSMENT — PAIN DESCRIPTION - FREQUENCY: FREQUENCY: INTERMITTENT

## 2018-10-24 ASSESSMENT — PAIN DESCRIPTION - LOCATION: LOCATION: FINGER (COMMENT WHICH ONE);HAND

## 2018-10-24 ASSESSMENT — PAIN DESCRIPTION - PAIN TYPE: TYPE: ACUTE PAIN

## 2018-10-24 ASSESSMENT — PAIN DESCRIPTION - DESCRIPTORS: DESCRIPTORS: SORE;TIGHTNESS

## 2018-10-24 ASSESSMENT — PAIN DESCRIPTION - ORIENTATION: ORIENTATION: LEFT

## 2018-10-25 ENCOUNTER — HOSPITAL ENCOUNTER (OUTPATIENT)
Dept: OCCUPATIONAL THERAPY | Age: 63
Setting detail: THERAPIES SERIES
Discharge: HOME | End: 2018-10-25
Payer: COMMERCIAL

## 2018-10-25 PROCEDURE — 97110 THERAPEUTIC EXERCISES: CPT

## 2018-10-25 ASSESSMENT — PAIN DESCRIPTION - ORIENTATION: ORIENTATION: LEFT

## 2018-10-25 ASSESSMENT — PAIN DESCRIPTION - PAIN TYPE: TYPE: ACUTE PAIN

## 2018-10-25 ASSESSMENT — PAIN SCALES - GENERAL: PAINLEVEL_OUTOF10: 2

## 2018-10-25 ASSESSMENT — PAIN DESCRIPTION - LOCATION: LOCATION: HAND

## 2018-10-25 ASSESSMENT — PAIN DESCRIPTION - PROGRESSION: CLINICAL_PROGRESSION: GRADUALLY IMPROVING

## 2018-10-25 ASSESSMENT — PAIN DESCRIPTION - DESCRIPTORS: DESCRIPTORS: SORE

## 2018-10-25 ASSESSMENT — PAIN DESCRIPTION - FREQUENCY: FREQUENCY: INTERMITTENT

## 2018-10-26 ENCOUNTER — APPOINTMENT (OUTPATIENT)
Dept: OCCUPATIONAL THERAPY | Age: 63
End: 2018-10-26
Payer: COMMERCIAL

## 2018-10-29 ENCOUNTER — HOSPITAL ENCOUNTER (OUTPATIENT)
Dept: OCCUPATIONAL THERAPY | Age: 63
Setting detail: THERAPIES SERIES
Discharge: HOME | End: 2018-10-29
Payer: COMMERCIAL

## 2018-10-29 PROCEDURE — 97110 THERAPEUTIC EXERCISES: CPT

## 2018-11-01 ENCOUNTER — HOSPITAL ENCOUNTER (OUTPATIENT)
Dept: OCCUPATIONAL THERAPY | Age: 63
Setting detail: THERAPIES SERIES
Discharge: HOME | End: 2018-11-01
Payer: COMMERCIAL

## 2018-11-01 PROCEDURE — 97110 THERAPEUTIC EXERCISES: CPT

## 2018-11-01 ASSESSMENT — PAIN DESCRIPTION - LOCATION: LOCATION: HAND

## 2018-11-01 ASSESSMENT — PAIN DESCRIPTION - ORIENTATION: ORIENTATION: LEFT

## 2018-11-01 ASSESSMENT — PAIN DESCRIPTION - DESCRIPTORS: DESCRIPTORS: ACHING;SORE

## 2018-11-01 ASSESSMENT — PAIN DESCRIPTION - PROGRESSION: CLINICAL_PROGRESSION: GRADUALLY WORSENING

## 2018-11-01 ASSESSMENT — PAIN DESCRIPTION - FREQUENCY: FREQUENCY: INTERMITTENT

## 2018-11-01 ASSESSMENT — PAIN SCALES - GENERAL: PAINLEVEL_OUTOF10: 3

## 2018-11-01 ASSESSMENT — PAIN DESCRIPTION - PAIN TYPE: TYPE: ACUTE PAIN

## 2018-11-02 ENCOUNTER — HOSPITAL ENCOUNTER (OUTPATIENT)
Dept: OCCUPATIONAL THERAPY | Age: 63
Setting detail: THERAPIES SERIES
Discharge: HOME | End: 2018-11-02
Payer: COMMERCIAL

## 2018-11-02 PROCEDURE — 97110 THERAPEUTIC EXERCISES: CPT

## 2018-11-02 ASSESSMENT — PAIN DESCRIPTION - DESCRIPTORS: DESCRIPTORS: TIGHTNESS;SORE

## 2018-11-02 ASSESSMENT — PAIN DESCRIPTION - ORIENTATION: ORIENTATION: LEFT

## 2018-11-02 ASSESSMENT — PAIN SCALES - GENERAL: PAINLEVEL_OUTOF10: 3

## 2018-11-02 ASSESSMENT — PAIN DESCRIPTION - LOCATION: LOCATION: HAND

## 2018-11-02 ASSESSMENT — PAIN DESCRIPTION - PAIN TYPE: TYPE: ACUTE PAIN

## 2018-11-02 ASSESSMENT — PAIN DESCRIPTION - FREQUENCY: FREQUENCY: INTERMITTENT

## 2018-11-02 ASSESSMENT — PAIN DESCRIPTION - PROGRESSION: CLINICAL_PROGRESSION: NOT CHANGED

## 2018-11-05 ENCOUNTER — HOSPITAL ENCOUNTER (OUTPATIENT)
Dept: OCCUPATIONAL THERAPY | Age: 63
Setting detail: THERAPIES SERIES
Discharge: HOME | End: 2018-11-05
Payer: COMMERCIAL

## 2018-11-05 PROCEDURE — 97110 THERAPEUTIC EXERCISES: CPT

## 2018-11-05 ASSESSMENT — PAIN DESCRIPTION - LOCATION: LOCATION: FINGER (COMMENT WHICH ONE);HAND

## 2018-11-05 ASSESSMENT — PAIN SCALES - GENERAL: PAINLEVEL_OUTOF10: 2

## 2018-11-05 ASSESSMENT — PAIN DESCRIPTION - PAIN TYPE: TYPE: ACUTE PAIN

## 2018-11-05 ASSESSMENT — PAIN DESCRIPTION - PROGRESSION: CLINICAL_PROGRESSION: GRADUALLY IMPROVING

## 2018-11-05 ASSESSMENT — PAIN DESCRIPTION - ORIENTATION: ORIENTATION: LEFT

## 2018-11-05 ASSESSMENT — PAIN DESCRIPTION - DESCRIPTORS: DESCRIPTORS: TIGHTNESS;SORE

## 2018-11-05 ASSESSMENT — PAIN DESCRIPTION - FREQUENCY: FREQUENCY: INTERMITTENT

## 2018-11-05 NOTE — PROGRESS NOTES
(PIP and DIPs) 25x each 2 sets , OT holds lt MPs flexed to 90 then pt flexes PIPs w DIPs extedned 25x 2sets (not little finger)  Other exercises 5: juxaciser using lt UE move disc over and back 7 sets  Other exercises 7: purdue peg board lt hand every other hole assemble then metal piece, small washser, column (26 sets)  Other exercises 10: lt hand place/remove graded clothespins - yellow - min resistance,  red medium resistance, green moderate resistance, and blue heavy resistance (pt uses thumb , index, long fingers for 3 jaw pinch)  Other exercises 11: red 3# digiflex  lt hand gripping 2 sets 25x, yellow 1.5# digiflex lt hand - each finger(thumb, index, long, ring,) isolated pinching 25x  Other exercises 13: Velcro roller #3 on thin velcro strip - down and back x 10, roller #2  on large velcro strip for flexion/extension up/back 10x  Other exercises 14: red 10# flex bar - U x25, upside down U x25, twist x25 (pt holds on w lt hand -thumb,index, long, & ring fingers - stack splint rt little finger)  Other exercises 18: BTE tool 162 lt hand 3 jaw pinch - T = 55 inlb 120 seconds  Assessment  Performance deficits / Impairments: Decreased ROM, Decreased coordination, Decreased strength  Assessment: OT able to passively flex lt hand (index, long, ring) to touch palm and do PROM lt little finger MP joint. Pt continues to wear stack splint on lt little finger. Increase tightness felt during PROM lt index. Begun [place] peg board exercise to improve lt hand finemotor skills. Tx focus on therapeutic exercises to increase lt hand motion, lt wrist/hand strength & coordination for daily tasks. See OT exercises for details.    Treatment Diagnosis: Lt hand/wrist weakness, stiffness, impaired coordination, pain, swelling  Prognosis: Good  REQUIRES OT FOLLOW UP: Yes  Discharge Recommendations: Outpatient OT           Plan  REQUIRES OT FOLLOW UP: Yes  Plan  Times per week: 4-5  Plan weeks: 6 weeks  Current Treatment Recommendations: ROM, Strengthening, Patient/Caregiver Education & Training (coordination)  Plan Comment: continue OT   OT Individual Minutes  Time In: 1330  Time Out: 3759  Minutes: 50  Time Code Minutes   Timed Code Treatment Minutes: 50 Minutes    Electronically signed by Diana Mantilla OT on 11/5/18 at 5:43 PM          Treatment Charges:  Minutes Units   Ultrasound     Electrical Stim     Iontophoresis     Paraffin      Massage     Eval     ADL      Ther Exercise 50 3   Ther Activities     Neuro Re-Ed     Splinting      Other     Total Treatment Time:  50

## 2018-11-07 ENCOUNTER — HOSPITAL ENCOUNTER (OUTPATIENT)
Dept: OCCUPATIONAL THERAPY | Age: 63
Setting detail: THERAPIES SERIES
Discharge: HOME | End: 2018-11-07
Payer: COMMERCIAL

## 2018-11-07 PROCEDURE — 97110 THERAPEUTIC EXERCISES: CPT

## 2018-11-07 ASSESSMENT — PAIN DESCRIPTION - PROGRESSION: CLINICAL_PROGRESSION: GRADUALLY IMPROVING

## 2018-11-07 NOTE — PROGRESS NOTES
Occupational [address] [facility]   Rehabilitation Services  Occupational Therapy Treatment Note  Date: 18  Patient Name: Bryn Khalil    MRN: [id number]  Account: [de-identified]   : 1955  (58 y.o.) Gender: female     General  Additional Pertinent Hx: 2018 CLOSED REDUCTION PINNING LEFT INDEX FINGER . 2018  LEFT WRIST OPEN REDUCTION INTERNAL FIXATION  . 10-8-18 lt hand manipulation   Referring Practitioner: Dr Ori Huertas   Diagnosis: Closed colles fx of lt radius (ICD-10 code S52.532D),closed nondisplaced fx of neck of 2nd metacarpal bone lt hand (ICD-10 code-S62.361)  OT Visit Information  OT Insurance Information: Medical [place] [employer] Plus Plan  Total # of Visits Approved: 30  Total # of Visits to Date:   Progress Note Counter: See Dr Ita Malloyly , and pt will see Dr Isabelle Gibson on   Subjective  Subjective: Pt states lt hand feels ok today. Pt reports work is busy. Pain Assessment  Patient Currently in Pain: Denies  Clinical Progression: Gradually improving        Wrist/Hand Exercises  Pre Pronation/Supination Reps/Sets/Weight: lt 2# 2 sets of 20x  Pre Wrist Flexion Reps/Sets/Weight: lt palm up over ramp 2# 2 sets of 20x  Pre Wrist Ext Reps/Sets/Weight: lt palm down over ramp 2# 2 sets 20x  Pre Radial Deviation Reps/Sets/Weight: lt wrist RD/UD 2# 2 sets 20x  Other exercises  Other exercises 2: PROM, joint mobilization, and edema massage lt wrist and hand thumb, index, long, ring fingers. PROM lt little finger MP joints,AROM lt little finger MP/PIP w stack splint on.lt little finger DIP not done - stack splint on it.   Other exercises 3: Lt hand finger blocking exercises : thumb IP, index, long, ring, fingers (PIP and DIPs) 25x each 2 sets , OT holds lt MPs flexed to 90 then pt flexes PIPs w DIPs extedned 25x 2sets (not little finger)  Other exercises 7: purdue peg board lt hand every other hole assemble then metal piece, small washser, column (26 sets)  Other exercises 9:
LEFT TENDERNESS

## 2018-11-08 ENCOUNTER — HOSPITAL ENCOUNTER (OUTPATIENT)
Dept: OCCUPATIONAL THERAPY | Age: 63
Setting detail: THERAPIES SERIES
Discharge: HOME | End: 2018-11-08
Payer: COMMERCIAL

## 2018-11-08 PROCEDURE — 97110 THERAPEUTIC EXERCISES: CPT

## 2018-11-08 NOTE — PROGRESS NOTES
2sets (not little finger)  Other exercises 7: purdue peg board lt hand every other hole assemble then metal piece, small washser, column (26 sets)  Other exercises 9: small cone using lt hand to push into putty 50x (holding wide end of cone), 50x (holding small end of cone)  Other exercises 11: red 3# digiflex  lt hand gripping 2 sets 25x, red 3# digiflex lt hand - each finger(thumb, index, long, ring,) isolated pinching 25x  Other exercises 14: red 10# flex bar - U x25, upside down U x25, twist x25 (pt holds on w lt hand -thumb,index, long, & ring fingers - stack splint rt little finger)  Other exercises 18: BTE tool 162 lt hand 3 jaw pinch - T = 55 inlb 180 seconds  Assessment  Performance deficits / Impairments: Decreased ROM, Decreased coordination, Decreased strength  Assessment: Pt completes higher wt for lt wrist strengthening exercises w no pain reports. Pt's lt hand tires after 2 minutes pinching w BTE tool 162. Tx focus on lt wrist/hand therapeutic exercises for rom, dexterity and strength. Pt continues w tightness lt index PIP and MP joints. See OT exercises for details.    Treatment Diagnosis: Lt hand/wrist weakness, stiffness, impaired coordination, pain, swelling  Prognosis: Good  REQUIRES OT FOLLOW UP: Yes  Discharge Recommendations: Outpatient OT   Plan  REQUIRES OT FOLLOW UP: Yes  Plan  Times per week: 4-5  Plan weeks: 6 weeks  Current Treatment Recommendations: ROM, Strengthening, Patient/Caregiver Education & Training (coordination)  Plan Comment: continue OT   OT Individual Minutes  Time In: 6008  Time Out: 4242  Minutes: 49  Time Code Minutes   Timed Code Treatment Minutes: 49 Minutes    Electronically signed by Kylie Covington OT on 11/8/18 at 3:56 PM        Treatment Charges:  Minutes Units   Ultrasound     Electrical Stim     Iontophoresis     Paraffin      Massage     Eval     ADL      Ther Exercise 49 3   Ther Activities     Neuro Re-Ed     Splinting      Other     Total Treatment Time:  49

## 2018-11-09 ENCOUNTER — HOSPITAL ENCOUNTER (OUTPATIENT)
Dept: OCCUPATIONAL THERAPY | Age: 63
Setting detail: THERAPIES SERIES
Discharge: HOME | End: 2018-11-09
Payer: COMMERCIAL

## 2018-11-09 PROCEDURE — 97110 THERAPEUTIC EXERCISES: CPT

## 2018-11-09 ASSESSMENT — PAIN DESCRIPTION - DESCRIPTORS: DESCRIPTORS: TIGHTNESS;SORE

## 2018-11-09 ASSESSMENT — PAIN DESCRIPTION - ORIENTATION: ORIENTATION: LEFT

## 2018-11-09 ASSESSMENT — PAIN DESCRIPTION - PAIN TYPE: TYPE: ACUTE PAIN

## 2018-11-09 ASSESSMENT — PAIN DESCRIPTION - LOCATION: LOCATION: FINGER (COMMENT WHICH ONE);HAND

## 2018-11-09 ASSESSMENT — PAIN SCALES - GENERAL: PAINLEVEL_OUTOF10: 4

## 2018-11-09 ASSESSMENT — PAIN DESCRIPTION - FREQUENCY: FREQUENCY: INTERMITTENT

## 2018-11-09 NOTE — PROGRESS NOTES
exercises : thumb IP, index, long, ring, fingers (PIP and DIPs) 25x each 2 sets , OT holds lt MPs flexed to 90 then pt flexes PIPs w DIPs extedned 25x 2sets (not little finger)  Other exercises 9: small cone using lt hand to push into putty 50x (holding wide end of cone), 50x (holding small end of cone)  Other exercises 10: lt hand place/remove graded clothespins - yellow - min resistance,  red medium resistance, green moderate resistance, and blue heavy resistance (pt uses thumb , index, long fingers for 3 jaw pinch)  Other exercises 11: red 3# digiflex  lt hand gripping 2 sets 25x, red 3# digiflex lt hand - each finger(thumb, index, long, ring,) isolated pinching 25x  Other exercises 13: Velcro roller #3 on thin velcro strip - down and back x 10, roller #2  on large velcro strip for flexion/extension up/back 10x  Other exercises 14: red 10# flex bar - U x25, upside down U x25, twist x25 (pt holds on w lt hand -thumb,index, long, & ring fingers - stack splint rt little finger)  Other exercises 17: valpar 4 nuts/bolts - use lt hand to unscrew/screw (8 )- 1\" diameter nuts onto/off bolt  Other exercises 18: BTE tool 162 lt hand 3 jaw pinch - T = 55 inlb 180 seconds  Other exercises 19: green moderate resistance power web lt hand opposition (to index, to long finger, to ring finger ) 25x each   Assessment  Performance deficits / Impairments: Decreased ROM, Decreased coordination, Decreased strength  Assessment: Pt completes lt hand therapeutic  exercises to improve lt hand AROM, fine motor dexterity and strength. Pt presents w rt index PROM/AROM. At end of tx coban wrap to lt index to decrease swelling.    Treatment Diagnosis: Lt hand/wrist weakness, stiffness, impaired coordination, pain, swelling  Prognosis: Good  REQUIRES OT FOLLOW UP: Yes  Discharge Recommendations: Outpatient OT           Plan  REQUIRES OT FOLLOW UP: Yes  Plan  Times per week: 4-5  Plan weeks: 6 weeks  Current Treatment Recommendations: ROM, Strengthening, Patient/Caregiver Education & Training (coordination)  Plan Comment: continue OT   OT Individual Minutes  Time In: 1331  Time Out: [address]  Minutes: 49  Time Code Minutes   Timed Code Treatment Minutes: 52 Minutes    Electronically signed by Adolfo Wayne OT on 11/9/18 at 2:28 PM          Treatment Charges:  Minutes Units   Ultrasound     Electrical Stim     Iontophoresis     Paraffin      Massage     Eval     ADL      Ther Exercise 49 3   Ther Activities     Neuro Re-Ed     Splinting      Other     Total Treatment Time:  49

## 2018-11-13 ENCOUNTER — OFFICE VISIT (OUTPATIENT)
Dept: ORTHOPEDIC SURGERY | Age: 63
End: 2018-11-13
Payer: COMMERCIAL

## 2018-11-13 VITALS — HEIGHT: 63 IN | BODY MASS INDEX: 31.89 KG/M2 | WEIGHT: 180 LBS

## 2018-11-13 DIAGNOSIS — M75.22 LEFT BICIPITAL TENOSYNOVITIS: ICD-10-CM

## 2018-11-13 DIAGNOSIS — M75.122 COMPLETE TEAR OF LEFT ROTATOR CUFF: Primary | ICD-10-CM

## 2018-11-13 PROCEDURE — 99213 OFFICE O/P EST LOW 20 MIN: CPT | Performed by: ORTHOPAEDIC SURGERY

## 2018-11-15 ENCOUNTER — HOSPITAL ENCOUNTER (OUTPATIENT)
Dept: OCCUPATIONAL THERAPY | Age: 63
Setting detail: THERAPIES SERIES
Discharge: HOME | End: 2018-11-15
Payer: COMMERCIAL

## 2018-11-15 PROCEDURE — 97530 THERAPEUTIC ACTIVITIES: CPT

## 2018-11-15 PROCEDURE — 9900000074 HC THERAPEUTIC ACTIVITIES PER 15 MIN (SELF-PAY)

## 2018-11-15 PROCEDURE — 97110 THERAPEUTIC EXERCISES: CPT

## 2018-11-20 ENCOUNTER — OFFICE VISIT (OUTPATIENT)
Dept: ORTHOPEDIC SURGERY | Age: 63
End: 2018-11-20

## 2018-11-20 DIAGNOSIS — M20.012 MALLET FINGER OF LEFT HAND: Primary | ICD-10-CM

## 2018-11-20 DIAGNOSIS — S62.361D CLOSED NONDISPLACED FRACTURE OF NECK OF SECOND METACARPAL BONE OF LEFT HAND WITH ROUTINE HEALING, SUBSEQUENT ENCOUNTER: ICD-10-CM

## 2018-11-20 DIAGNOSIS — M80.00XD AGE-RELATED OSTEOPOROSIS WITH CURRENT PATHOLOGICAL FRACTURE WITH ROUTINE HEALING, SUBSEQUENT ENCOUNTER: ICD-10-CM

## 2018-11-20 DIAGNOSIS — S52.532D CLOSED COLLES' FRACTURE OF LEFT RADIUS WITH ROUTINE HEALING, SUBSEQUENT ENCOUNTER: ICD-10-CM

## 2018-11-20 PROCEDURE — 99024 POSTOP FOLLOW-UP VISIT: CPT | Performed by: ORTHOPAEDIC SURGERY

## 2018-11-21 ENCOUNTER — HOSPITAL ENCOUNTER (OUTPATIENT)
Dept: OCCUPATIONAL THERAPY | Age: 63
Setting detail: THERAPIES SERIES
Discharge: HOME | End: 2018-11-21
Payer: COMMERCIAL

## 2018-11-21 PROCEDURE — 97110 THERAPEUTIC EXERCISES: CPT

## 2018-11-21 ASSESSMENT — PAIN DESCRIPTION - FREQUENCY: FREQUENCY: INTERMITTENT

## 2018-11-21 ASSESSMENT — PAIN DESCRIPTION - DESCRIPTORS: DESCRIPTORS: SORE;TIGHTNESS

## 2018-11-21 ASSESSMENT — PAIN SCALES - GENERAL: PAINLEVEL_OUTOF10: 2

## 2018-11-21 ASSESSMENT — PAIN DESCRIPTION - PAIN TYPE: TYPE: ACUTE PAIN

## 2018-11-21 ASSESSMENT — PAIN DESCRIPTION - ORIENTATION: ORIENTATION: LEFT

## 2018-11-21 ASSESSMENT — PAIN DESCRIPTION - PROGRESSION: CLINICAL_PROGRESSION: GRADUALLY IMPROVING

## 2018-11-21 ASSESSMENT — PAIN DESCRIPTION - LOCATION: LOCATION: FINGER (COMMENT WHICH ONE);HAND

## 2018-11-26 ENCOUNTER — HOSPITAL ENCOUNTER (OUTPATIENT)
Dept: OCCUPATIONAL THERAPY | Age: 63
Setting detail: THERAPIES SERIES
Discharge: HOME | End: 2018-11-26
Payer: COMMERCIAL

## 2018-11-26 PROCEDURE — 97110 THERAPEUTIC EXERCISES: CPT

## 2018-11-29 ENCOUNTER — HOSPITAL ENCOUNTER (OUTPATIENT)
Dept: OCCUPATIONAL THERAPY | Age: 63
Setting detail: THERAPIES SERIES
Discharge: HOME | End: 2018-11-29
Payer: COMMERCIAL

## 2018-11-29 PROCEDURE — 97110 THERAPEUTIC EXERCISES: CPT

## 2018-11-29 NOTE — PROGRESS NOTES
Occupational [address] [facility]   Rehabilitation Services  Occupational Therapy Treatment Note  Date: 18  Patient Name: Ariadne Lopez    MRN: [id number]  Account: [de-identified]   : 1955  (61 y.o.) Gender: female     General  Additional Pertinent Hx: 2018 CLOSED REDUCTION PINNING LEFT INDEX FINGER . 2018  LEFT WRIST OPEN REDUCTION INTERNAL FIXATION  . 10-8-18 lt hand manipulation   Referring Practitioner: Dr Samantha Murdock   Diagnosis: Closed colles fx of lt radius (ICD-10 code S52.532D),closed nondisplaced fx of neck of 2nd metacarpal bone lt hand (ICD-10 code-S62.361)  OT Visit Information  OT Insurance Information: Medical [place] [employer]isset Plus Plan  Total # of Visits Approved: 38  Total # of Visits to Date:   Subjective  Subjective: Pt can remove stack splint on Tues Dec 4th. Pt states her son is home.    Pain Assessment  Patient Currently in Pain: Denies        Wrist/Hand Exercises  Pre Pronation/Supination Reps/Sets/Weight: lt 3# 2 sets 20x  Pre Wrist Flexion Reps/Sets/Weight: lt 3# 2 sets 20x  lt wrist palm up   Pre Wrist Ext Reps/Sets/Weight: lt 3# 2 sets 20x  lt wrist palm down  Pre Radial Deviation Reps/Sets/Weight: lt 3# 2 sets 20x  lt wrist  RD/UD  Other exercises  Other exercises 2: PROM ,stretching, tendon gliding, joint mobilization, massage lt hand all fingers except small finger w stack splint  Other exercises 3: lt hand finger(index, long, ring) blockin sets 25x PIP,2 sets 25x DIP, (except lt little finger w stack finger), MP hand flexed at 90 then pt flexes /extends PIP/DIP 25x  Other exercises 7: purdue peg board lt hand every other hole assemble then metal piece, small washser, column (26 sets)  Other exercises 9: press cone into putty -large cone (using lt hand hold small end press down 40x, hold large end cone press down 40x)  Other exercises 10: lt hand place/remove graded clothespins - yellow - min resistance,  red medium resistance, green moderate resistance, and blue heavy resistance (pt uses thumb , index, long fingers for 3 jaw pinch)  Other exercises 11: green 5# digiflex lt hand gripping 25x , isolated digit (index, long,ring) pinching 25x each  Other exercises 13: velcro roller # 3  up/down thin velcro strip 10x, roller #2  up/down wide velcro roller 10x  Other exercises 14: red 10# flex bar - U x25, upside down U x25, twist x25 (pt holds on w lt hand -thumb,index, long, & ring fingers - stack splint rt little finger)  Other exercises 18: BTE tool 162 lt hand 3 jaw pinch - T = 55 inlb 300 seconds  Other exercises 19: green moderate resistance power web lt hand gripping 25x, isolated pinch lt index, long, & ring   Assessment  Performance deficits / Impairments: Decreased ROM, Decreased strength, Decreased coordination  Assessment: Pt presents w increase tightness and limitations AROM lt index finger today. PROM lt index, long, ring fingers wfls w tightness lt index. Pt continues w stack splint lt little finget till next week, then OT will progress exercises for lt little finger. Pt completes lt hand /wrist coordination and strengthening exericses. Pt reports able to do work tasks.    Treatment Diagnosis: lt hand wrist weakness, stiffness, impaired coordination, pain, swelling  Prognosis: Good  REQUIRES OT FOLLOW UP: Yes  Discharge Recommendations: Outpatient OT           Plan  REQUIRES OT FOLLOW UP: Yes  Plan  Times per week: 2x  Plan weeks: 4  Current Treatment Recommendations: ROM, Strengthening, Patient/Caregiver Education & Training  Plan Comment: continue OT  OT Individual Minutes  Time In: 5078  Time Out: 8754  Minutes: 60  Time Code Minutes   Timed Code Treatment Minutes: 60 Minutes    Electronically signed by Sammy Gauthier OT on 11/29/18 at 3:51 PM          Treatment Charges:  Minutes Units   Ultrasound     Electrical Stim     Iontophoresis     Paraffin      Massage     Eval     ADL      Ther Exercise 60 4   Ther Activities     Neuro Re-Ed

## 2018-12-03 ENCOUNTER — HOSPITAL ENCOUNTER (OUTPATIENT)
Dept: OCCUPATIONAL THERAPY | Age: 63
Setting detail: THERAPIES SERIES
Discharge: HOME | End: 2018-12-03
Payer: COMMERCIAL

## 2018-12-03 PROCEDURE — 97110 THERAPEUTIC EXERCISES: CPT

## 2018-12-06 ENCOUNTER — HOSPITAL ENCOUNTER (OUTPATIENT)
Dept: OCCUPATIONAL THERAPY | Age: 63
Setting detail: THERAPIES SERIES
Discharge: HOME | End: 2018-12-06
Payer: COMMERCIAL

## 2018-12-06 PROCEDURE — 97110 THERAPEUTIC EXERCISES: CPT

## 2018-12-06 NOTE — PROGRESS NOTES
flex bar - U x25, upside down U x25, twist x25 (pt holds on w lt hand -thumb,index, long, & ring fingers - stack splint rt little finger)  Other exercises 18: BTE tool 162 lt hand 3 jaw pinch - T = 55 inlb 120 seconds  Other exercises 20:  lt hand gripping T = 55 [id number] seconds  Assessment  Performance deficits / Impairments: Decreased ROM, Decreased strength, Decreased coordination  Assessment: OT able to do PROM all fingers lt hand since she doesn't have to wear stack splint on lt little finger. Tx focus on tendon gliding all fingers lt hand to facilitate AROM to improve flexion for grasp. OT begun BTE tool 162 for lt hand gripping, tool 302 flat knob for lt wrist/hand RD/UD, tool 601 for lt hand grasp during strengthening for supination/pronation. Pt correctly demonstrates exercises w a little Lt hand soreness. .Pt completes lt hand coordination exercises to improve lt hand flexion for holding juxaciser, & to improve lt little finger flexion and opposition when removing key pegs. See OT exericses for details. Treatment Diagnosis: lt hand wrist weakness, stiffness, impaired coordination, pain, swelling  Prognosis: Good  Patient Education: HEP - OT instructed pt to do finger blocking exericses for lt little finger PIP and DIP. Pt correctly demonstrates exercises.   Barriers to Learning: none  REQUIRES OT FOLLOW UP: Yes  Discharge Recommendations: Outpatient OT           Plan  REQUIRES OT FOLLOW UP: Yes  Plan  Times per week: 2x  Plan weeks: 4  Current Treatment Recommendations: ROM, Strengthening, Patient/Caregiver Education & Training (coordination)  Plan Comment: continue OT  OT Individual Minutes  Time In: 8855  Time Out: 7523  Minutes: 49  Time Code Minutes   Timed Code Treatment Minutes: 49 Minutes    Electronically signed by Mahesh Burns OT on 12/6/18 at 5:58 PM          Treatment Charges:  Minutes Units   Ultrasound     Electrical Stim     Iontophoresis     Paraffin      Massage     Eval     ADL

## 2018-12-10 ENCOUNTER — HOSPITAL ENCOUNTER (OUTPATIENT)
Dept: OCCUPATIONAL THERAPY | Age: 63
Setting detail: THERAPIES SERIES
Discharge: HOME | End: 2018-12-10
Payer: COMMERCIAL

## 2018-12-10 PROCEDURE — 97110 THERAPEUTIC EXERCISES: CPT

## 2018-12-10 NOTE — PROGRESS NOTES
Occupational [address] [facility]   Rehabilitation Services  Occupational Therapy Treatment Note  Date: 12/10/18  Patient Name: Era Mario    MRN: [id number]  Account: [de-identified]   : 1955  (64 y.o.) Gender: female     General  Referring Practitioner: Dr Arturo Rosado   Diagnosis: Closed colles fx of lt radius (ICD-10 code S52.532D),closed nondisplaced fx of neck of 2nd metacarpal bone lt hand (ICD-10 code-S62.361)  OT Visit Information  OT Insurance Information: Medical [place] [employer]isset Plus Plan  Total # of Visits Approved: 38  Total # of Visits to Date: 34  Subjective  Subjective: Pt states her lt hand was sore after last tx. OT told pt that we upgraded the exercises to include the little finger more.  Pt states her lt little finger is moving more  Pain Assessment  Patient Currently in Pain: Denies        Wrist/Hand Exercises  Pre Pronation/Supination Reps/Sets/Weight: lt 3# 2 sets 20x  Pre Wrist Flexion Reps/Sets/Weight: lt 3# 2 sets 20x  lt wrist palm up   Pre Wrist Ext Reps/Sets/Weight: lt 3# 2 sets 20x  lt wrist palm down  Pre Radial Deviation Reps/Sets/Weight: lt 3# 2 sets 20x  lt wrist  RD/UD  Other exercises  Other exercises 2: PROM ,stretching, tendon gliding, joint mobilization, massage lt hand all fingers  (index, long, ring, little), place hold lt hand in fist position 10x hold 10 seconds  Other exercises 3: lt hand finger(index, long, ring) blockin sets 25x PIP,2 sets 25x DIP, (all fingers), MP hand flexed at 90 then pt flexes /extends PIP/DIP 0x  Other exercises 8: BTE tool 601 lt hand/wrist sup/pronate T = 5 inlb 120 seconds  Other exercises 9: press cone into putty -large cone (using lt hand hold small end press down 40x, hold large end cone press down 40x)  Other exercises 10: lt hand place/remove graded clothespins - yellow - min resistance,  red medium resistance, green moderate resistance, and blue heavy resistance (pt uses thumb , index, long fingers for 3 jaw

## 2018-12-13 ENCOUNTER — HOSPITAL ENCOUNTER (OUTPATIENT)
Dept: OCCUPATIONAL THERAPY | Age: 63
Setting detail: THERAPIES SERIES
Discharge: HOME | End: 2018-12-13
Payer: COMMERCIAL

## 2018-12-13 PROCEDURE — 97110 THERAPEUTIC EXERCISES: CPT

## 2018-12-13 NOTE — PROGRESS NOTES
Occupational [address] [place]   Rehabilitation Services  Occupational Therapy Treatment Note  Date: 18  Patient Name: Royce Charles    MRN: [id number]  Account: [de-identified]   : 1955  (61 y.o.) Gender: female     General  Additional Pertinent Hx: 2018 CLOSED REDUCTION PINNING LEFT INDEX FINGER . 2018  LEFT WRIST OPEN REDUCTION INTERNAL FIXATION  . 10-8-18 lt hand manipulation   Referring Practitioner: Dr Sherri Campbell   Diagnosis: Closed colles fx of lt radius (ICD-10 code S52.532D),closed nondisplaced fx of neck of 2nd metacarpal bone lt hand (ICD-10 code-S62.361)  OT Visit Information  OT Insurance Information: Medical [place] Lisset Plus Plan  Total # of Visits Approved: 38  Total # of Visits to Date: 35  Subjective  Subjective: Pt reports work was busy. Pain Assessment  Patient Currently in Pain: Denies           Other exercises  Other exercises 1: HEP - lt wrist(flexor, extensor, RD) strengthening w lime green theraband, wrist flexor stretch/wt bearing w pt standing and her palms on table, lt hand isometric strengthening of lumbricals w MPs flexed, PIP extension -isometric strengthening of extensors, MP active extension w palm on table  Other exercises 2: PROM ,stretching, tendon gliding, joint mobilization, massage lt hand all fingers  (index, long, ring, little), place hold lt hand in fist position 10x hold 10 seconds. PROM lt wrist  Other exercises 3: lt hand finger(index, long, ring) blockin sets 25x PIP,2 sets 25x DIP, (all fingers), MP hand flexed at 90 then pt flexes /extends PIP/DIP 0x  Other exercises 8: BTE tool 601 lt hand/wrist sup/pronate T = 5 inlb 120 seconds  Other exercises 11: green 5# digiflex lt hand gripping 25x , isolated digit (index, long,ring) pinching 25x each  Other exercises 14: red 10# flex bar - U x25, upside down U x25, twist x25 (pt holds on w lt hand -thumb,index, long, & ring fingers - stack splint rt little finger)  Other exercises 18:

## 2018-12-17 ENCOUNTER — HOSPITAL ENCOUNTER (OUTPATIENT)
Dept: OCCUPATIONAL THERAPY | Age: 63
Setting detail: THERAPIES SERIES
Discharge: HOME | End: 2018-12-17
Payer: COMMERCIAL

## 2018-12-17 PROCEDURE — 97110 THERAPEUTIC EXERCISES: CPT

## 2018-12-17 NOTE — PROGRESS NOTES
Occupational [address] [facility]   Rehabilitation Services  Occupational Therapy Treatment Note  Date: 18  Patient Name: Heidi Restrepo    MRN: [id number]  Account: [de-identified]   : 1955  (61 y.o.) Gender: female     General  Additional Pertinent Hx: 2018 CLOSED REDUCTION PINNING LEFT INDEX FINGER . 2018  LEFT WRIST OPEN REDUCTION INTERNAL FIXATION  . 10-8-18 lt hand manipulation   Referring Practitioner: Dr Sheila Nichols   Diagnosis: Closed colles fx of lt radius (ICD-10 code S52.532D),closed nondisplaced fx of neck of 2nd metacarpal bone lt hand (ICD-10 code-S62.361)  OT Visit Information  OT Insurance Information: Medical [place] Lisset Plus Plan  Total # of Visits Approved: 38  Total # of Visits to Date: 36  Subjective  Subjective: Pt state that she is having difficulty moving lt thumb and extending it since yesterday. Pt states that she had a fall last week but her wrist is doing fine. Pt states that she called MD but he wasn't in office today. Pt will see MD  this week regarding her thumb. Pain Assessment  Patient Currently in Pain: Denies        Wrist/Hand Exercises  Pre Pronation/Supination Reps/Sets/Weight: lt 3# 2 sets 20x  Pre Wrist Flexion Reps/Sets/Weight: lt 3# 2 sets 20x  lt wrist palm up   Pre Wrist Ext Reps/Sets/Weight: lt 3# 2 sets 20x  lt wrist palm down  Pre Radial Deviation Reps/Sets/Weight: lt 3# 2 sets 20x  lt wrist  RD/UD  Other exercises  Other exercises 2: PROM ,stretching, tendon gliding, joint mobilization, massage lt hand all fingers  (index, long, ring, little), place hold lt hand in fist position 10x hold 10 seconds. PROM lt wrist  Other exercises 3: lt hand finger(index, long, ring) blockin sets 25x PIP,2 sets 25x DIP, (all fingers), MP hand flexed at 90 then pt flexes /extends PIP/DIP 25x  Other exercises 7: purdue peg board lt hand every other hole assemble then metal piece, small washser, column (26 sets)  Other exercises 13: velcro roller # 3

## 2018-12-18 ENCOUNTER — NURSE TRIAGE (OUTPATIENT)
Dept: OTHER | Facility: CLINIC | Age: 63
End: 2018-12-18

## 2018-12-18 ENCOUNTER — APPOINTMENT (OUTPATIENT)
Dept: CT IMAGING | Age: 63
End: 2018-12-18
Payer: COMMERCIAL

## 2018-12-18 ENCOUNTER — HOSPITAL ENCOUNTER (EMERGENCY)
Age: 63
Discharge: HOME | End: 2018-12-18
Attending: EMERGENCY MEDICINE
Payer: COMMERCIAL

## 2018-12-18 VITALS
OXYGEN SATURATION: 92 % | BODY MASS INDEX: 31.89 KG/M2 | TEMPERATURE: 98.1 F | DIASTOLIC BLOOD PRESSURE: 95 MMHG | HEART RATE: 77 BPM | SYSTOLIC BLOOD PRESSURE: 140 MMHG | WEIGHT: 180 LBS | HEIGHT: 63 IN | RESPIRATION RATE: 18 BRPM

## 2018-12-18 DIAGNOSIS — N20.1 URETEROLITHIASIS: Primary | ICD-10-CM

## 2018-12-18 LAB
-: ABNORMAL
ABSOLUTE EOS #: 0.1 K/UL (ref 0–0.4)
ABSOLUTE IMMATURE GRANULOCYTE: ABNORMAL K/UL (ref 0–0.3)
ABSOLUTE LYMPH #: 1.5 K/UL (ref 1–4.8)
ABSOLUTE MONO #: 0.7 K/UL (ref 0.1–1.3)
ALBUMIN SERPL-MCNC: 4.7 G/DL (ref 3.5–5.2)
ALBUMIN/GLOBULIN RATIO: ABNORMAL (ref 1–2.5)
ALP BLD-CCNC: 85 U/L (ref 35–104)
ALT SERPL-CCNC: 13 U/L (ref 5–33)
AMORPHOUS: ABNORMAL
ANION GAP SERPL CALCULATED.3IONS-SCNC: 12 MMOL/L (ref 9–17)
AST SERPL-CCNC: 16 U/L
BACTERIA: ABNORMAL
BASOPHILS # BLD: 1 % (ref 0–2)
BASOPHILS ABSOLUTE: 0.1 K/UL (ref 0–0.2)
BILIRUB SERPL-MCNC: 0.62 MG/DL (ref 0.3–1.2)
BILIRUBIN URINE: ABNORMAL
BUN BLDV-MCNC: 16 MG/DL (ref 8–23)
BUN/CREAT BLD: ABNORMAL (ref 9–20)
CALCIUM SERPL-MCNC: 9.1 MG/DL (ref 8.6–10.4)
CASTS UA: ABNORMAL /LPF
CHLORIDE BLD-SCNC: 106 MMOL/L (ref 98–107)
CO2: 25 MMOL/L (ref 20–31)
COLOR: YELLOW
COMMENT UA: ABNORMAL
CREAT SERPL-MCNC: 0.88 MG/DL (ref 0.5–0.9)
CRYSTALS, UA: ABNORMAL /HPF
DIFFERENTIAL TYPE: ABNORMAL
EOSINOPHILS RELATIVE PERCENT: 2 % (ref 0–4)
EPITHELIAL CELLS UA: ABNORMAL /HPF
GFR AFRICAN AMERICAN: >60 ML/MIN
GFR NON-AFRICAN AMERICAN: >60 ML/MIN
GFR SERPL CREATININE-BSD FRML MDRD: ABNORMAL ML/MIN/{1.73_M2}
GFR SERPL CREATININE-BSD FRML MDRD: ABNORMAL ML/MIN/{1.73_M2}
GLUCOSE BLD-MCNC: 133 MG/DL (ref 70–99)
GLUCOSE URINE: NEGATIVE
HCT VFR BLD CALC: 40.4 % (ref 36–46)
HEMOGLOBIN: 13.4 G/DL (ref 12–16)
IMMATURE GRANULOCYTES: ABNORMAL %
KETONES, URINE: ABNORMAL
LEUKOCYTE ESTERASE, URINE: NEGATIVE
LYMPHOCYTES # BLD: 20 % (ref 24–44)
MAGNESIUM: 1.9 MG/DL (ref 1.6–2.6)
MCH RBC QN AUTO: 30.5 PG (ref 26–34)
MCHC RBC AUTO-ENTMCNC: 33.2 G/DL (ref 31–37)
MCV RBC AUTO: 91.9 FL (ref 80–100)
MONOCYTES # BLD: 9 % (ref 1–7)
MUCUS: ABNORMAL
NITRITE, URINE: NEGATIVE
NRBC AUTOMATED: ABNORMAL PER 100 WBC
OTHER OBSERVATIONS UA: ABNORMAL
PDW BLD-RTO: 12.8 % (ref 11.5–14.9)
PH UA: 5.5 (ref 5–8)
PLATELET # BLD: 251 K/UL (ref 150–450)
PLATELET ESTIMATE: ABNORMAL
PMV BLD AUTO: 8.2 FL (ref 6–12)
POTASSIUM SERPL-SCNC: 3.4 MMOL/L (ref 3.7–5.3)
PROTEIN UA: ABNORMAL
RBC # BLD: 4.39 M/UL (ref 4–5.2)
RBC # BLD: ABNORMAL 10*6/UL
RBC UA: ABNORMAL /HPF
RENAL EPITHELIAL, UA: ABNORMAL /HPF
SEG NEUTROPHILS: 68 % (ref 36–66)
SEGMENTED NEUTROPHILS ABSOLUTE COUNT: 5.1 K/UL (ref 1.3–9.1)
SODIUM BLD-SCNC: 143 MMOL/L (ref 135–144)
SPECIFIC GRAVITY UA: 1.03 (ref 1–1.03)
TOTAL PROTEIN: 7.4 G/DL (ref 6.4–8.3)
TRICHOMONAS: ABNORMAL
TURBIDITY: ABNORMAL
URINE HGB: ABNORMAL
UROBILINOGEN, URINE: NORMAL
WBC # BLD: 7.5 K/UL (ref 3.5–11)
WBC # BLD: ABNORMAL 10*3/UL
WBC UA: ABNORMAL /HPF
YEAST: ABNORMAL

## 2018-12-18 PROCEDURE — 83735 ASSAY OF MAGNESIUM: CPT

## 2018-12-18 PROCEDURE — 74176 CT ABD & PELVIS W/O CONTRAST: CPT

## 2018-12-18 PROCEDURE — 96375 TX/PRO/DX INJ NEW DRUG ADDON: CPT

## 2018-12-18 PROCEDURE — 80053 COMPREHEN METABOLIC PANEL: CPT

## 2018-12-18 PROCEDURE — 6360000002 HC RX W HCPCS: Performed by: EMERGENCY MEDICINE

## 2018-12-18 PROCEDURE — 96374 THER/PROPH/DIAG INJ IV PUSH: CPT

## 2018-12-18 PROCEDURE — 99284 EMERGENCY DEPT VISIT MOD MDM: CPT

## 2018-12-18 PROCEDURE — 81001 URINALYSIS AUTO W/SCOPE: CPT

## 2018-12-18 PROCEDURE — 85025 COMPLETE CBC W/AUTO DIFF WBC: CPT

## 2018-12-18 PROCEDURE — 2580000003 HC RX 258: Performed by: EMERGENCY MEDICINE

## 2018-12-18 RX ORDER — METOCLOPRAMIDE HYDROCHLORIDE 5 MG/ML
10 INJECTION INTRAMUSCULAR; INTRAVENOUS ONCE
Status: COMPLETED | OUTPATIENT
Start: 2018-12-18 | End: 2018-12-18

## 2018-12-18 RX ORDER — KETOROLAC TROMETHAMINE 30 MG/ML
15 INJECTION, SOLUTION INTRAMUSCULAR; INTRAVENOUS ONCE
Status: COMPLETED | OUTPATIENT
Start: 2018-12-18 | End: 2018-12-18

## 2018-12-18 RX ORDER — 0.9 % SODIUM CHLORIDE 0.9 %
1000 INTRAVENOUS SOLUTION INTRAVENOUS ONCE
Status: COMPLETED | OUTPATIENT
Start: 2018-12-18 | End: 2018-12-18

## 2018-12-18 RX ORDER — MORPHINE SULFATE 4 MG/ML
4 INJECTION, SOLUTION INTRAMUSCULAR; INTRAVENOUS ONCE
Status: COMPLETED | OUTPATIENT
Start: 2018-12-18 | End: 2018-12-18

## 2018-12-18 RX ADMIN — MORPHINE SULFATE 4 MG: 4 INJECTION INTRAVENOUS at 14:25

## 2018-12-18 RX ADMIN — KETOROLAC TROMETHAMINE 15 MG: 30 INJECTION, SOLUTION INTRAMUSCULAR at 13:48

## 2018-12-18 RX ADMIN — METOCLOPRAMIDE 10 MG: 5 INJECTION, SOLUTION INTRAMUSCULAR; INTRAVENOUS at 13:48

## 2018-12-18 RX ADMIN — SODIUM CHLORIDE 1000 ML: 9 INJECTION, SOLUTION INTRAVENOUS at 13:51

## 2018-12-18 ASSESSMENT — PAIN SCALES - GENERAL
PAINLEVEL_OUTOF10: 10
PAINLEVEL_OUTOF10: 8
PAINLEVEL_OUTOF10: 3

## 2018-12-20 ENCOUNTER — OFFICE VISIT (OUTPATIENT)
Dept: ORTHOPEDIC SURGERY | Age: 63
End: 2018-12-20

## 2018-12-20 ENCOUNTER — HOSPITAL ENCOUNTER (OUTPATIENT)
Dept: OCCUPATIONAL THERAPY | Age: 63
Setting detail: THERAPIES SERIES
Discharge: HOME | End: 2018-12-20
Payer: COMMERCIAL

## 2018-12-20 DIAGNOSIS — M80.00XD AGE-RELATED OSTEOPOROSIS WITH CURRENT PATHOLOGICAL FRACTURE WITH ROUTINE HEALING, SUBSEQUENT ENCOUNTER: ICD-10-CM

## 2018-12-20 DIAGNOSIS — M20.012 MALLET FINGER OF LEFT HAND: Primary | ICD-10-CM

## 2018-12-20 DIAGNOSIS — S62.361D CLOSED NONDISPLACED FRACTURE OF NECK OF SECOND METACARPAL BONE OF LEFT HAND WITH ROUTINE HEALING, SUBSEQUENT ENCOUNTER: ICD-10-CM

## 2018-12-20 PROCEDURE — 99024 POSTOP FOLLOW-UP VISIT: CPT | Performed by: ORTHOPAEDIC SURGERY

## 2018-12-20 PROCEDURE — 97110 THERAPEUTIC EXERCISES: CPT

## 2018-12-20 ASSESSMENT — PAIN DESCRIPTION - LOCATION: LOCATION: HAND;WRIST

## 2018-12-20 ASSESSMENT — PAIN DESCRIPTION - PAIN TYPE: TYPE: ACUTE PAIN

## 2018-12-20 ASSESSMENT — 9 HOLE PEG TEST
TESTTIME_SECONDS: 21
TEST_RESULT: FUNCTIONAL

## 2018-12-20 ASSESSMENT — PAIN DESCRIPTION - ORIENTATION: ORIENTATION: LEFT

## 2018-12-20 ASSESSMENT — PAIN SCALES - GENERAL: PAINLEVEL_OUTOF10: 3

## 2018-12-20 ASSESSMENT — PAIN DESCRIPTION - DESCRIPTORS: DESCRIPTORS: SORE

## 2018-12-20 NOTE — PROGRESS NOTES
peg test using lt hand 20-25 seconds faster than eval. Opposition lt thumb to all fingers - goal met  Long term goals  Long term goal 1: Pt will report improved functional status on the [employer] when using lt hand/wrist scoring 2 little difficulty w grasping, light lifting/carrying w household activities . Goal met for lift/carry w home task. Goal partly met w grasping. Long term goal 2: Pt will demonstrate increase lt wrist strength (flexor,extensor, RD/UD) to 4/5 & lt hand strength (flexor,extensor ) to 4/5 for participation in daily home/leisure activities. Goal met lt wrist (flexors,extensors, UD) & lt hand(flexors,extensors). Goal partly met wrist RD   Long term goal 3: Pt will demonstrate lt  strength 20-25# - goal met  Long term goal 4: Pt will increase lt pinch strength (lateral , tip, 3jaw) by 6# compared to evaluation.- goal met lateral and tip pinch, goal partly met 3jaw pinch  Long term goal 5: Pt will complete 9 holes peg test using lt hand 45-60 seconds faster than eval. - goal met  Long term goals 6: Decrease swelling lt wrist/hand by 1cm (wrist girth, DPC,  girth around metacarpals). -goal met at wrist, goal partly met at metacarpals  Long term goal 7: Pt will demonstrate improved lt hand grasp and hold/stabilize objects during daily activities. Pt  will demonstrate complete composite flexion(lt hand AROM -wfls), and make a fist using lt hand. Goal met except for lt little finger  Long term goal 8: Compared to eval increase AROM lt wrist (flexion and extension by 20-25, UD by 15-20) - goal met w wrist flexion increased by 20, wrist extension increased by 30, wrist UD increased by 20  Long term goal 9: PROM lt hand - wfls - goal met    Plan  REQUIRES OT FOLLOW UP: No  Plan  Plan Comment: Discontinue OT. Pt to continue w HEP for lt wrist/hand. OT told pt not to do the lt wrist /handexercises that include radial deviation since she is having problems w her lt thumb.   OT Individual Minutes  Time In: 1450  Time Out: 1538  Minutes: 48  Time Code Minutes   Timed Code Treatment Minutes: 40 Minutes  Treatment Included:  [x] Therapeutic Exercise     [x] Instruction in HEP       [x] Plans/Goals, Risk/Benefits discussed with pt  Comprehension of Education [] D/V Understanding  [] Needs Review  Pt Education: [x] Verbal  [] Demo  [] Written    /Regulatory Requirements:   I have reviewed this plan of care and certify a need for Medically necessary rehabilitation services.         [] Physician Signature                                      Date:   [address]   [address], [address]  Phone: [phone]  Fax: [phone]  Electronically signed by Filiberto Bloch, OT on 12/20/18 at 5:32 PM    Treatment Charges:  Minutes Units   Ultrasound     Electrical Stim     Iontophoresis     Paraffin      Massage     Eval     ADL      Ther Exercise 40 3   Ther Activities     Neuro Re-Ed     Splinting      Other     Total Treatment Time:  48

## 2019-01-04 DIAGNOSIS — H66.90 EAR INFECTION: Primary | ICD-10-CM

## 2019-01-04 RX ORDER — AZITHROMYCIN 500 MG/1
500 TABLET, FILM COATED ORAL DAILY
Qty: 1 PACKET | Refills: 0 | Status: SHIPPED | OUTPATIENT
Start: 2019-01-04 | End: 2019-01-07

## 2019-01-08 ENCOUNTER — HOSPITAL ENCOUNTER (OUTPATIENT)
Dept: PREADMISSION TESTING | Age: 64
Discharge: HOME | End: 2019-01-12
Payer: COMMERCIAL

## 2019-01-08 VITALS
RESPIRATION RATE: 16 BRPM | TEMPERATURE: 98.2 F | HEIGHT: 63 IN | OXYGEN SATURATION: 96 % | DIASTOLIC BLOOD PRESSURE: 87 MMHG | BODY MASS INDEX: 33.49 KG/M2 | SYSTOLIC BLOOD PRESSURE: 143 MMHG | HEART RATE: 80 BPM | WEIGHT: 189 LBS

## 2019-01-08 LAB
ABSOLUTE EOS #: 0.2 K/UL (ref 0–0.4)
ABSOLUTE IMMATURE GRANULOCYTE: NORMAL K/UL (ref 0–0.3)
ABSOLUTE LYMPH #: 1.6 K/UL (ref 1–4.8)
ABSOLUTE MONO #: 0.4 K/UL (ref 0.1–1.3)
BASOPHILS # BLD: 1 % (ref 0–2)
BASOPHILS ABSOLUTE: 0 K/UL (ref 0–0.2)
DIFFERENTIAL TYPE: NORMAL
EOSINOPHILS RELATIVE PERCENT: 4 % (ref 0–4)
HCT VFR BLD CALC: 37.6 % (ref 36–46)
HEMOGLOBIN: 12.6 G/DL (ref 12–16)
IMMATURE GRANULOCYTES: NORMAL %
LYMPHOCYTES # BLD: 30 % (ref 24–44)
MCH RBC QN AUTO: 30.9 PG (ref 26–34)
MCHC RBC AUTO-ENTMCNC: 33.6 G/DL (ref 31–37)
MCV RBC AUTO: 91.8 FL (ref 80–100)
MONOCYTES # BLD: 7 % (ref 1–7)
NRBC AUTOMATED: NORMAL PER 100 WBC
PDW BLD-RTO: 12.4 % (ref 11.5–14.9)
PLATELET # BLD: 250 K/UL (ref 150–450)
PLATELET ESTIMATE: NORMAL
PMV BLD AUTO: 8.6 FL (ref 6–12)
RBC # BLD: 4.1 M/UL (ref 4–5.2)
RBC # BLD: NORMAL 10*6/UL
SEG NEUTROPHILS: 58 % (ref 36–66)
SEGMENTED NEUTROPHILS ABSOLUTE COUNT: 3 K/UL (ref 1.3–9.1)
WBC # BLD: 5.2 K/UL (ref 3.5–11)
WBC # BLD: NORMAL 10*3/UL

## 2019-01-08 PROCEDURE — 36415 COLL VENOUS BLD VENIPUNCTURE: CPT

## 2019-01-08 PROCEDURE — 85025 COMPLETE CBC W/AUTO DIFF WBC: CPT

## 2019-01-09 ENCOUNTER — ANESTHESIA EVENT (OUTPATIENT)
Dept: OPERATING ROOM | Age: 64
End: 2019-01-09
Payer: COMMERCIAL

## 2019-01-09 RX ORDER — SODIUM CHLORIDE 0.9 % (FLUSH) 0.9 %
10 SYRINGE (ML) INJECTION EVERY 12 HOURS SCHEDULED
Status: CANCELLED | OUTPATIENT
Start: 2019-01-09

## 2019-01-09 RX ORDER — SODIUM CHLORIDE 0.9 % (FLUSH) 0.9 %
10 SYRINGE (ML) INJECTION PRN
Status: CANCELLED | OUTPATIENT
Start: 2019-01-09

## 2019-01-09 RX ORDER — SODIUM CHLORIDE, SODIUM LACTATE, POTASSIUM CHLORIDE, CALCIUM CHLORIDE 600; 310; 30; 20 MG/100ML; MG/100ML; MG/100ML; MG/100ML
INJECTION, SOLUTION INTRAVENOUS CONTINUOUS
Status: CANCELLED | OUTPATIENT
Start: 2019-01-09

## 2019-01-09 RX ORDER — LIDOCAINE HYDROCHLORIDE 10 MG/ML
1 INJECTION, SOLUTION EPIDURAL; INFILTRATION; INTRACAUDAL; PERINEURAL
Status: CANCELLED | OUTPATIENT
Start: 2019-01-09 | End: 2019-01-09

## 2019-01-18 ENCOUNTER — OFFICE VISIT (OUTPATIENT)
Dept: ORTHOPEDIC SURGERY | Age: 64
End: 2019-01-18
Payer: COMMERCIAL

## 2019-01-18 DIAGNOSIS — M75.122 COMPLETE TEAR OF LEFT ROTATOR CUFF: Primary | ICD-10-CM

## 2019-01-18 PROCEDURE — 99213 OFFICE O/P EST LOW 20 MIN: CPT | Performed by: ORTHOPAEDIC SURGERY

## 2019-01-18 RX ORDER — ONDANSETRON 4 MG/1
4 TABLET, FILM COATED ORAL DAILY PRN
Qty: 20 TABLET | Refills: 0 | Status: SHIPPED | OUTPATIENT
Start: 2019-01-18

## 2019-01-18 RX ORDER — HYDROCODONE BITARTRATE AND ACETAMINOPHEN 5; 325 MG/1; MG/1
1 TABLET ORAL EVERY 4 HOURS
Qty: 42 TABLET | Refills: 0 | Status: SHIPPED | OUTPATIENT
Start: 2019-01-18 | End: 2019-01-25

## 2019-01-21 ENCOUNTER — HOSPITAL ENCOUNTER (OUTPATIENT)
Age: 64
Setting detail: OUTPATIENT SURGERY
Discharge: HOME | End: 2019-01-21
Attending: ORTHOPAEDIC SURGERY | Admitting: ORTHOPAEDIC SURGERY
Payer: COMMERCIAL

## 2019-01-21 ENCOUNTER — ANESTHESIA (OUTPATIENT)
Dept: OPERATING ROOM | Age: 64
End: 2019-01-21
Payer: COMMERCIAL

## 2019-01-21 ENCOUNTER — PREP FOR PROCEDURE (OUTPATIENT)
Dept: ORTHOPEDIC SURGERY | Age: 64
End: 2019-01-21

## 2019-01-21 VITALS — OXYGEN SATURATION: 99 % | SYSTOLIC BLOOD PRESSURE: 82 MMHG | TEMPERATURE: 97.3 F | DIASTOLIC BLOOD PRESSURE: 61 MMHG

## 2019-01-21 VITALS
WEIGHT: 189 LBS | DIASTOLIC BLOOD PRESSURE: 63 MMHG | RESPIRATION RATE: 16 BRPM | TEMPERATURE: 97.5 F | BODY MASS INDEX: 33.49 KG/M2 | HEIGHT: 63 IN | SYSTOLIC BLOOD PRESSURE: 105 MMHG | OXYGEN SATURATION: 93 % | HEART RATE: 85 BPM

## 2019-01-21 DIAGNOSIS — M75.122 COMPLETE TEAR OF LEFT ROTATOR CUFF: Primary | ICD-10-CM

## 2019-01-21 PROCEDURE — 7100000001 HC PACU RECOVERY - ADDTL 15 MIN: Performed by: ORTHOPAEDIC SURGERY

## 2019-01-21 PROCEDURE — 6360000002 HC RX W HCPCS: Performed by: NURSE ANESTHETIST, CERTIFIED REGISTERED

## 2019-01-21 PROCEDURE — 29827 SHO ARTHRS SRG RT8TR CUF RPR: CPT | Performed by: ORTHOPAEDIC SURGERY

## 2019-01-21 PROCEDURE — 2580000003 HC RX 258: Performed by: ANESTHESIOLOGY

## 2019-01-21 PROCEDURE — 7100000011 HC PHASE II RECOVERY - ADDTL 15 MIN: Performed by: ORTHOPAEDIC SURGERY

## 2019-01-21 PROCEDURE — 7100000030 HC ASPR PHASE II RECOVERY - FIRST 15 MIN: Performed by: ORTHOPAEDIC SURGERY

## 2019-01-21 PROCEDURE — 7100000031 HC ASPR PHASE II RECOVERY - ADDTL 15 MIN: Performed by: ORTHOPAEDIC SURGERY

## 2019-01-21 PROCEDURE — 7100000000 HC PACU RECOVERY - FIRST 15 MIN: Performed by: ORTHOPAEDIC SURGERY

## 2019-01-21 PROCEDURE — 7100000010 HC PHASE II RECOVERY - FIRST 15 MIN: Performed by: ORTHOPAEDIC SURGERY

## 2019-01-21 PROCEDURE — 2709999900 HC NON-CHARGEABLE SUPPLY: Performed by: ORTHOPAEDIC SURGERY

## 2019-01-21 PROCEDURE — C1713 ANCHOR/SCREW BN/BN,TIS/BN: HCPCS | Performed by: ORTHOPAEDIC SURGERY

## 2019-01-21 PROCEDURE — 2720000010 HC SURG SUPPLY STERILE: Performed by: ORTHOPAEDIC SURGERY

## 2019-01-21 PROCEDURE — 64415 NJX AA&/STRD BRCH PLXS IMG: CPT | Performed by: ANESTHESIOLOGY

## 2019-01-21 PROCEDURE — 3700000001 HC ADD 15 MINUTES (ANESTHESIA): Performed by: ORTHOPAEDIC SURGERY

## 2019-01-21 PROCEDURE — 3600000004 HC SURGERY LEVEL 4 BASE: Performed by: ORTHOPAEDIC SURGERY

## 2019-01-21 PROCEDURE — 6360000002 HC RX W HCPCS: Performed by: ORTHOPAEDIC SURGERY

## 2019-01-21 PROCEDURE — 29828 SHO ARTHRS SRG BICP TENODSIS: CPT | Performed by: ORTHOPAEDIC SURGERY

## 2019-01-21 PROCEDURE — 3700000000 HC ANESTHESIA ATTENDED CARE: Performed by: ORTHOPAEDIC SURGERY

## 2019-01-21 PROCEDURE — 2500000003 HC RX 250 WO HCPCS: Performed by: NURSE ANESTHETIST, CERTIFIED REGISTERED

## 2019-01-21 PROCEDURE — L3650 SO 8 ABD RESTRAINT PRE OTS: HCPCS | Performed by: ORTHOPAEDIC SURGERY

## 2019-01-21 PROCEDURE — 3600000014 HC SURGERY LEVEL 4 ADDTL 15MIN: Performed by: ORTHOPAEDIC SURGERY

## 2019-01-21 DEVICE — ANCHOR SUT L14.7MM DIA5.5MM BIOCOMPOSITE FULL THRD W/ 1.3MM: Type: IMPLANTABLE DEVICE | Site: SHOULDER | Status: FUNCTIONAL

## 2019-01-21 DEVICE — ANCHOR SUT L24.5MM DIA4.75MM BIOCOMPOSITE SELF PUNCHING: Type: IMPLANTABLE DEVICE | Site: SHOULDER | Status: FUNCTIONAL

## 2019-01-21 RX ORDER — SODIUM CHLORIDE 0.9 % (FLUSH) 0.9 %
10 SYRINGE (ML) INJECTION PRN
Status: DISCONTINUED | OUTPATIENT
Start: 2019-01-21 | End: 2019-01-21 | Stop reason: HOSPADM

## 2019-01-21 RX ORDER — SODIUM CHLORIDE 0.9 % (FLUSH) 0.9 %
10 SYRINGE (ML) INJECTION EVERY 12 HOURS SCHEDULED
Status: DISCONTINUED | OUTPATIENT
Start: 2019-01-21 | End: 2019-01-21 | Stop reason: HOSPADM

## 2019-01-21 RX ORDER — OXYCODONE HYDROCHLORIDE AND ACETAMINOPHEN 5; 325 MG/1; MG/1
1 TABLET ORAL PRN
Status: DISCONTINUED | OUTPATIENT
Start: 2019-01-21 | End: 2019-01-21 | Stop reason: HOSPADM

## 2019-01-21 RX ORDER — OXYCODONE HYDROCHLORIDE AND ACETAMINOPHEN 5; 325 MG/1; MG/1
2 TABLET ORAL PRN
Status: DISCONTINUED | OUTPATIENT
Start: 2019-01-21 | End: 2019-01-21 | Stop reason: HOSPADM

## 2019-01-21 RX ORDER — MIDAZOLAM HYDROCHLORIDE 1 MG/ML
INJECTION INTRAMUSCULAR; INTRAVENOUS PRN
Status: DISCONTINUED | OUTPATIENT
Start: 2019-01-21 | End: 2019-01-21 | Stop reason: SDUPTHER

## 2019-01-21 RX ORDER — ROCURONIUM BROMIDE 10 MG/ML
INJECTION, SOLUTION INTRAVENOUS PRN
Status: DISCONTINUED | OUTPATIENT
Start: 2019-01-21 | End: 2019-01-21 | Stop reason: SDUPTHER

## 2019-01-21 RX ORDER — NEOSTIGMINE METHYLSULFATE 5 MG/5 ML
SYRINGE (ML) INTRAVENOUS PRN
Status: DISCONTINUED | OUTPATIENT
Start: 2019-01-21 | End: 2019-01-21 | Stop reason: SDUPTHER

## 2019-01-21 RX ORDER — ONDANSETRON 2 MG/ML
INJECTION INTRAMUSCULAR; INTRAVENOUS PRN
Status: DISCONTINUED | OUTPATIENT
Start: 2019-01-21 | End: 2019-01-21 | Stop reason: SDUPTHER

## 2019-01-21 RX ORDER — FENTANYL CITRATE 50 UG/ML
INJECTION, SOLUTION INTRAMUSCULAR; INTRAVENOUS PRN
Status: DISCONTINUED | OUTPATIENT
Start: 2019-01-21 | End: 2019-01-21 | Stop reason: SDUPTHER

## 2019-01-21 RX ORDER — DIPHENHYDRAMINE HYDROCHLORIDE 50 MG/ML
12.5 INJECTION INTRAMUSCULAR; INTRAVENOUS
Status: DISCONTINUED | OUTPATIENT
Start: 2019-01-21 | End: 2019-01-21 | Stop reason: HOSPADM

## 2019-01-21 RX ORDER — MEPERIDINE HYDROCHLORIDE 50 MG/ML
12.5 INJECTION INTRAMUSCULAR; INTRAVENOUS; SUBCUTANEOUS EVERY 5 MIN PRN
Status: DISCONTINUED | OUTPATIENT
Start: 2019-01-21 | End: 2019-01-21 | Stop reason: HOSPADM

## 2019-01-21 RX ORDER — MORPHINE SULFATE 2 MG/ML
1 INJECTION, SOLUTION INTRAMUSCULAR; INTRAVENOUS EVERY 5 MIN PRN
Status: DISCONTINUED | OUTPATIENT
Start: 2019-01-21 | End: 2019-01-21 | Stop reason: HOSPADM

## 2019-01-21 RX ORDER — PROPOFOL 10 MG/ML
INJECTION, EMULSION INTRAVENOUS PRN
Status: DISCONTINUED | OUTPATIENT
Start: 2019-01-21 | End: 2019-01-21 | Stop reason: SDUPTHER

## 2019-01-21 RX ORDER — SODIUM CHLORIDE 0.9 % (FLUSH) 0.9 %
10 SYRINGE (ML) INJECTION PRN
Status: CANCELLED | OUTPATIENT
Start: 2019-01-21

## 2019-01-21 RX ORDER — ONDANSETRON 2 MG/ML
4 INJECTION INTRAMUSCULAR; INTRAVENOUS
Status: DISCONTINUED | OUTPATIENT
Start: 2019-01-21 | End: 2019-01-21 | Stop reason: HOSPADM

## 2019-01-21 RX ORDER — KETOROLAC TROMETHAMINE 30 MG/ML
INJECTION, SOLUTION INTRAMUSCULAR; INTRAVENOUS PRN
Status: DISCONTINUED | OUTPATIENT
Start: 2019-01-21 | End: 2019-01-21 | Stop reason: SDUPTHER

## 2019-01-21 RX ORDER — FENTANYL CITRATE 50 UG/ML
25 INJECTION, SOLUTION INTRAMUSCULAR; INTRAVENOUS EVERY 5 MIN PRN
Status: DISCONTINUED | OUTPATIENT
Start: 2019-01-21 | End: 2019-01-21 | Stop reason: HOSPADM

## 2019-01-21 RX ORDER — DEXAMETHASONE SODIUM PHOSPHATE 10 MG/ML
10 INJECTION, SOLUTION INTRAMUSCULAR; INTRAVENOUS ONCE
Status: CANCELLED | OUTPATIENT
Start: 2019-01-21 | End: 2019-01-21

## 2019-01-21 RX ORDER — SODIUM CHLORIDE, SODIUM LACTATE, POTASSIUM CHLORIDE, CALCIUM CHLORIDE 600; 310; 30; 20 MG/100ML; MG/100ML; MG/100ML; MG/100ML
INJECTION, SOLUTION INTRAVENOUS CONTINUOUS
Status: DISCONTINUED | OUTPATIENT
Start: 2019-01-21 | End: 2019-01-21 | Stop reason: HOSPADM

## 2019-01-21 RX ORDER — DEXAMETHASONE SODIUM PHOSPHATE 4 MG/ML
10 INJECTION, SOLUTION INTRA-ARTICULAR; INTRALESIONAL; INTRAMUSCULAR; INTRAVENOUS; SOFT TISSUE ONCE
Status: DISCONTINUED | OUTPATIENT
Start: 2019-01-21 | End: 2019-01-21 | Stop reason: HOSPADM

## 2019-01-21 RX ORDER — SODIUM CHLORIDE 0.9 % (FLUSH) 0.9 %
10 SYRINGE (ML) INJECTION EVERY 12 HOURS SCHEDULED
Status: CANCELLED | OUTPATIENT
Start: 2019-01-21

## 2019-01-21 RX ORDER — GLYCOPYRROLATE 1 MG/5 ML
SYRINGE (ML) INTRAVENOUS PRN
Status: DISCONTINUED | OUTPATIENT
Start: 2019-01-21 | End: 2019-01-21 | Stop reason: SDUPTHER

## 2019-01-21 RX ORDER — LIDOCAINE HYDROCHLORIDE 10 MG/ML
1 INJECTION, SOLUTION EPIDURAL; INFILTRATION; INTRACAUDAL; PERINEURAL
Status: DISCONTINUED | OUTPATIENT
Start: 2019-01-21 | End: 2019-01-21 | Stop reason: HOSPADM

## 2019-01-21 RX ORDER — LIDOCAINE HYDROCHLORIDE 10 MG/ML
INJECTION, SOLUTION EPIDURAL; INFILTRATION; INTRACAUDAL; PERINEURAL PRN
Status: DISCONTINUED | OUTPATIENT
Start: 2019-01-21 | End: 2019-01-21 | Stop reason: SDUPTHER

## 2019-01-21 RX ORDER — EPHEDRINE SULFATE 50 MG/ML
INJECTION, SOLUTION INTRAVENOUS PRN
Status: DISCONTINUED | OUTPATIENT
Start: 2019-01-21 | End: 2019-01-21 | Stop reason: SDUPTHER

## 2019-01-21 RX ORDER — DEXAMETHASONE SODIUM PHOSPHATE 10 MG/ML
INJECTION INTRAMUSCULAR; INTRAVENOUS PRN
Status: DISCONTINUED | OUTPATIENT
Start: 2019-01-21 | End: 2019-01-21 | Stop reason: SDUPTHER

## 2019-01-21 RX ADMIN — PHENYLEPHRINE HYDROCHLORIDE 50 MCG: 10 INJECTION INTRAVENOUS at 14:35

## 2019-01-21 RX ADMIN — PHENYLEPHRINE HYDROCHLORIDE 150 MCG: 10 INJECTION INTRAVENOUS at 13:17

## 2019-01-21 RX ADMIN — FENTANYL CITRATE 50 MCG: 50 INJECTION, SOLUTION INTRAMUSCULAR; INTRAVENOUS at 12:03

## 2019-01-21 RX ADMIN — ONDANSETRON 4 MG: 2 INJECTION INTRAMUSCULAR; INTRAVENOUS at 14:39

## 2019-01-21 RX ADMIN — KETOROLAC TROMETHAMINE 30 MG: 30 INJECTION, SOLUTION INTRAMUSCULAR at 14:53

## 2019-01-21 RX ADMIN — Medication 3 MG: at 14:39

## 2019-01-21 RX ADMIN — DEXAMETHASONE SODIUM PHOSPHATE 10 MG: 10 INJECTION INTRAMUSCULAR; INTRAVENOUS at 13:04

## 2019-01-21 RX ADMIN — ROCURONIUM BROMIDE 50 MG: 10 INJECTION INTRAVENOUS at 12:50

## 2019-01-21 RX ADMIN — PHENYLEPHRINE HYDROCHLORIDE 100 MCG: 10 INJECTION INTRAVENOUS at 13:20

## 2019-01-21 RX ADMIN — SODIUM CHLORIDE, POTASSIUM CHLORIDE, SODIUM LACTATE AND CALCIUM CHLORIDE: 600; 310; 30; 20 INJECTION, SOLUTION INTRAVENOUS at 11:37

## 2019-01-21 RX ADMIN — FENTANYL CITRATE 50 MCG: 50 INJECTION, SOLUTION INTRAMUSCULAR; INTRAVENOUS at 12:01

## 2019-01-21 RX ADMIN — PHENYLEPHRINE HYDROCHLORIDE 50 MCG: 10 INJECTION INTRAVENOUS at 13:58

## 2019-01-21 RX ADMIN — PROPOFOL 200 MG: 10 INJECTION, EMULSION INTRAVENOUS at 12:50

## 2019-01-21 RX ADMIN — EPHEDRINE SULFATE 5 MG: 50 INJECTION INTRAMUSCULAR; INTRAVENOUS; SUBCUTANEOUS at 14:35

## 2019-01-21 RX ADMIN — PHENYLEPHRINE HYDROCHLORIDE 200 MCG: 10 INJECTION INTRAVENOUS at 13:33

## 2019-01-21 RX ADMIN — PHENYLEPHRINE HYDROCHLORIDE 100 MCG: 10 INJECTION INTRAVENOUS at 13:41

## 2019-01-21 RX ADMIN — PHENYLEPHRINE HYDROCHLORIDE 200 MCG: 10 INJECTION INTRAVENOUS at 13:09

## 2019-01-21 RX ADMIN — PHENYLEPHRINE HYDROCHLORIDE 250 MCG: 10 INJECTION INTRAVENOUS at 13:13

## 2019-01-21 RX ADMIN — ROCURONIUM BROMIDE 10 MG: 10 INJECTION INTRAVENOUS at 14:29

## 2019-01-21 RX ADMIN — SODIUM CHLORIDE, POTASSIUM CHLORIDE, SODIUM LACTATE AND CALCIUM CHLORIDE: 600; 310; 30; 20 INJECTION, SOLUTION INTRAVENOUS at 14:35

## 2019-01-21 RX ADMIN — LIDOCAINE HYDROCHLORIDE 50 MG: 10 INJECTION, SOLUTION EPIDURAL; INFILTRATION; INTRACAUDAL; PERINEURAL at 12:50

## 2019-01-21 RX ADMIN — PHENYLEPHRINE HYDROCHLORIDE 200 MCG: 10 INJECTION INTRAVENOUS at 13:49

## 2019-01-21 RX ADMIN — PHENYLEPHRINE HYDROCHLORIDE 200 MCG: 10 INJECTION INTRAVENOUS at 13:23

## 2019-01-21 RX ADMIN — MIDAZOLAM 2 MG: 1 INJECTION INTRAMUSCULAR; INTRAVENOUS at 12:00

## 2019-01-21 RX ADMIN — Medication 2 G: at 12:49

## 2019-01-21 RX ADMIN — PHENYLEPHRINE HYDROCHLORIDE 100 MCG: 10 INJECTION INTRAVENOUS at 13:42

## 2019-01-21 RX ADMIN — PHENYLEPHRINE HYDROCHLORIDE 200 MCG: 10 INJECTION INTRAVENOUS at 13:28

## 2019-01-21 RX ADMIN — Medication 0.5 MG: at 14:39

## 2019-01-21 RX ADMIN — EPHEDRINE SULFATE 5 MG: 50 INJECTION INTRAMUSCULAR; INTRAVENOUS; SUBCUTANEOUS at 13:58

## 2019-01-21 RX ADMIN — FENTANYL CITRATE 50 MCG: 50 INJECTION, SOLUTION INTRAMUSCULAR; INTRAVENOUS at 12:02

## 2019-01-21 RX ADMIN — FENTANYL CITRATE 50 MCG: 50 INJECTION, SOLUTION INTRAMUSCULAR; INTRAVENOUS at 14:29

## 2019-01-21 RX ADMIN — PHENYLEPHRINE HYDROCHLORIDE 200 MCG: 10 INJECTION INTRAVENOUS at 13:06

## 2019-01-21 RX ADMIN — PHENYLEPHRINE HYDROCHLORIDE 100 MCG: 10 INJECTION INTRAVENOUS at 13:05

## 2019-01-21 ASSESSMENT — PULMONARY FUNCTION TESTS
PIF_VALUE: 17
PIF_VALUE: 15
PIF_VALUE: 18
PIF_VALUE: 17
PIF_VALUE: 14
PIF_VALUE: 16
PIF_VALUE: 17
PIF_VALUE: 2
PIF_VALUE: 17
PIF_VALUE: 5
PIF_VALUE: 18
PIF_VALUE: 16
PIF_VALUE: 17
PIF_VALUE: 16
PIF_VALUE: 17
PIF_VALUE: 17
PIF_VALUE: 19
PIF_VALUE: 18
PIF_VALUE: 17
PIF_VALUE: 16
PIF_VALUE: 0
PIF_VALUE: 17
PIF_VALUE: 19
PIF_VALUE: 17
PIF_VALUE: 19
PIF_VALUE: 17
PIF_VALUE: 19
PIF_VALUE: 17
PIF_VALUE: 1
PIF_VALUE: 1
PIF_VALUE: 22
PIF_VALUE: 17
PIF_VALUE: 18
PIF_VALUE: 1
PIF_VALUE: 12
PIF_VALUE: 17
PIF_VALUE: 17
PIF_VALUE: 16
PIF_VALUE: 0
PIF_VALUE: 17
PIF_VALUE: 16
PIF_VALUE: 19
PIF_VALUE: 17
PIF_VALUE: 19
PIF_VALUE: 17
PIF_VALUE: 15
PIF_VALUE: 17
PIF_VALUE: 16
PIF_VALUE: 17
PIF_VALUE: 13
PIF_VALUE: 18
PIF_VALUE: 17
PIF_VALUE: 17
PIF_VALUE: 18
PIF_VALUE: 17
PIF_VALUE: 6
PIF_VALUE: 17
PIF_VALUE: 17
PIF_VALUE: 18
PIF_VALUE: 16
PIF_VALUE: 14
PIF_VALUE: 15
PIF_VALUE: 14
PIF_VALUE: 17
PIF_VALUE: 18
PIF_VALUE: 1
PIF_VALUE: 17
PIF_VALUE: 18
PIF_VALUE: 1
PIF_VALUE: 17
PIF_VALUE: 15
PIF_VALUE: 0
PIF_VALUE: 16
PIF_VALUE: 16
PIF_VALUE: 17
PIF_VALUE: 16
PIF_VALUE: 17
PIF_VALUE: 16
PIF_VALUE: 14
PIF_VALUE: 14
PIF_VALUE: 17
PIF_VALUE: 16
PIF_VALUE: 16
PIF_VALUE: 18
PIF_VALUE: 17
PIF_VALUE: 19
PIF_VALUE: 17
PIF_VALUE: 16
PIF_VALUE: 17
PIF_VALUE: 17
PIF_VALUE: 2
PIF_VALUE: 3
PIF_VALUE: 17
PIF_VALUE: 19
PIF_VALUE: 17
PIF_VALUE: 18
PIF_VALUE: 1
PIF_VALUE: 16
PIF_VALUE: 19
PIF_VALUE: 17
PIF_VALUE: 18
PIF_VALUE: 17
PIF_VALUE: 16
PIF_VALUE: 17
PIF_VALUE: 15
PIF_VALUE: 4
PIF_VALUE: 18
PIF_VALUE: 24
PIF_VALUE: 17
PIF_VALUE: 19

## 2019-01-21 ASSESSMENT — PAIN SCALES - GENERAL
PAINLEVEL_OUTOF10: 0

## 2019-01-21 ASSESSMENT — PAIN - FUNCTIONAL ASSESSMENT: PAIN_FUNCTIONAL_ASSESSMENT: 0-10

## 2019-02-05 ENCOUNTER — OFFICE VISIT (OUTPATIENT)
Dept: ORTHOPEDIC SURGERY | Age: 64
End: 2019-02-05

## 2019-02-05 VITALS — WEIGHT: 188.93 LBS | BODY MASS INDEX: 33.48 KG/M2 | HEIGHT: 63 IN

## 2019-02-05 DIAGNOSIS — M75.122 COMPLETE TEAR OF LEFT ROTATOR CUFF: Primary | ICD-10-CM

## 2019-02-05 PROCEDURE — 99024 POSTOP FOLLOW-UP VISIT: CPT | Performed by: ORTHOPAEDIC SURGERY

## 2019-02-05 RX ORDER — IBUPROFEN 800 MG/1
800 TABLET ORAL 3 TIMES DAILY PRN
Qty: 30 TABLET | Refills: 0 | Status: SHIPPED | OUTPATIENT
Start: 2019-02-05

## 2019-02-12 ENCOUNTER — HOSPITAL ENCOUNTER (OUTPATIENT)
Dept: PHYSICAL THERAPY | Age: 64
Setting detail: THERAPIES SERIES
End: 2019-02-12
Payer: COMMERCIAL

## 2019-02-14 ENCOUNTER — APPOINTMENT (OUTPATIENT)
Dept: PHYSICAL THERAPY | Age: 64
End: 2019-02-14
Payer: COMMERCIAL

## 2019-02-14 ENCOUNTER — HOSPITAL ENCOUNTER (OUTPATIENT)
Dept: PHYSICAL THERAPY | Age: 64
Setting detail: THERAPIES SERIES
Discharge: HOME | End: 2019-02-14
Payer: COMMERCIAL

## 2019-02-14 PROCEDURE — 97140 MANUAL THERAPY 1/> REGIONS: CPT

## 2019-02-14 PROCEDURE — 97110 THERAPEUTIC EXERCISES: CPT

## 2019-02-14 PROCEDURE — 97016 VASOPNEUMATIC DEVICE THERAPY: CPT

## 2019-02-14 PROCEDURE — 97162 PT EVAL MOD COMPLEX 30 MIN: CPT

## 2019-02-14 ASSESSMENT — PAIN DESCRIPTION - LOCATION
LOCATION: ARM;SHOULDER
LOCATION: SHOULDER

## 2019-02-14 ASSESSMENT — PAIN DESCRIPTION - ONSET
ONSET: ON-GOING
ONSET: ON-GOING

## 2019-02-14 ASSESSMENT — PAIN DESCRIPTION - PROGRESSION
CLINICAL_PROGRESSION: GRADUALLY IMPROVING
CLINICAL_PROGRESSION: GRADUALLY IMPROVING

## 2019-02-14 ASSESSMENT — PAIN DESCRIPTION - FREQUENCY
FREQUENCY: CONTINUOUS
FREQUENCY: CONTINUOUS

## 2019-02-14 ASSESSMENT — PAIN - FUNCTIONAL ASSESSMENT
PAIN_FUNCTIONAL_ASSESSMENT: PREVENTS OR INTERFERES WITH MANY ACTIVE NOT PASSIVE ACTIVITIES
PAIN_FUNCTIONAL_ASSESSMENT: PREVENTS OR INTERFERES WITH MANY ACTIVE NOT PASSIVE ACTIVITIES

## 2019-02-14 ASSESSMENT — PAIN DESCRIPTION - PAIN TYPE
TYPE: SURGICAL PAIN
TYPE: ACUTE PAIN

## 2019-02-14 ASSESSMENT — PAIN SCALES - GENERAL
PAINLEVEL_OUTOF10: 2
PAINLEVEL_OUTOF10: 2

## 2019-02-14 ASSESSMENT — PAIN DESCRIPTION - DESCRIPTORS
DESCRIPTORS: ACHING;DULL
DESCRIPTORS: ACHING

## 2019-02-14 ASSESSMENT — PAIN DESCRIPTION - ORIENTATION
ORIENTATION: LEFT
ORIENTATION: LEFT;ANTERIOR

## 2019-02-21 ENCOUNTER — HOSPITAL ENCOUNTER (OUTPATIENT)
Dept: PHYSICAL THERAPY | Age: 64
Setting detail: THERAPIES SERIES
Discharge: HOME | End: 2019-02-21
Payer: COMMERCIAL

## 2019-02-21 PROCEDURE — 97016 VASOPNEUMATIC DEVICE THERAPY: CPT

## 2019-02-21 PROCEDURE — 97110 THERAPEUTIC EXERCISES: CPT

## 2019-02-21 ASSESSMENT — PAIN DESCRIPTION - PROGRESSION: CLINICAL_PROGRESSION: GRADUALLY IMPROVING

## 2019-02-21 ASSESSMENT — PAIN DESCRIPTION - ORIENTATION: ORIENTATION: LEFT

## 2019-02-21 ASSESSMENT — PAIN DESCRIPTION - PAIN TYPE: TYPE: SURGICAL PAIN

## 2019-02-21 ASSESSMENT — PAIN DESCRIPTION - LOCATION: LOCATION: SHOULDER

## 2019-02-21 ASSESSMENT — PAIN SCALES - GENERAL: PAINLEVEL_OUTOF10: 4

## 2019-02-21 ASSESSMENT — PAIN DESCRIPTION - DESCRIPTORS: DESCRIPTORS: ACHING;DULL

## 2019-02-25 ENCOUNTER — HOSPITAL ENCOUNTER (OUTPATIENT)
Dept: PHYSICAL THERAPY | Age: 64
Setting detail: THERAPIES SERIES
Discharge: HOME | End: 2019-02-25
Payer: COMMERCIAL

## 2019-02-25 PROCEDURE — 97140 MANUAL THERAPY 1/> REGIONS: CPT

## 2019-02-25 PROCEDURE — 97110 THERAPEUTIC EXERCISES: CPT

## 2019-02-25 PROCEDURE — 97016 VASOPNEUMATIC DEVICE THERAPY: CPT

## 2019-02-25 ASSESSMENT — PAIN DESCRIPTION - PAIN TYPE: TYPE: SURGICAL PAIN

## 2019-02-25 ASSESSMENT — PAIN DESCRIPTION - ORIENTATION: ORIENTATION: LEFT

## 2019-02-25 ASSESSMENT — PAIN DESCRIPTION - LOCATION: LOCATION: SHOULDER

## 2019-02-25 ASSESSMENT — PAIN SCALES - GENERAL: PAINLEVEL_OUTOF10: 4

## 2019-02-25 ASSESSMENT — PAIN DESCRIPTION - DESCRIPTORS: DESCRIPTORS: ACHING;DULL

## 2019-02-25 ASSESSMENT — PAIN DESCRIPTION - PROGRESSION: CLINICAL_PROGRESSION: GRADUALLY IMPROVING

## 2019-02-26 ENCOUNTER — HOSPITAL ENCOUNTER (OUTPATIENT)
Dept: PHYSICAL THERAPY | Age: 64
Setting detail: THERAPIES SERIES
Discharge: HOME | End: 2019-02-26
Payer: COMMERCIAL

## 2019-02-26 PROCEDURE — 97110 THERAPEUTIC EXERCISES: CPT

## 2019-02-26 PROCEDURE — 97016 VASOPNEUMATIC DEVICE THERAPY: CPT

## 2019-02-28 ENCOUNTER — HOSPITAL ENCOUNTER (OUTPATIENT)
Dept: PHYSICAL THERAPY | Age: 64
Setting detail: THERAPIES SERIES
Discharge: HOME | End: 2019-02-28
Payer: COMMERCIAL

## 2019-02-28 PROCEDURE — 97140 MANUAL THERAPY 1/> REGIONS: CPT

## 2019-02-28 PROCEDURE — 97016 VASOPNEUMATIC DEVICE THERAPY: CPT

## 2019-02-28 PROCEDURE — 97110 THERAPEUTIC EXERCISES: CPT

## 2019-03-04 ENCOUNTER — HOSPITAL ENCOUNTER (OUTPATIENT)
Dept: PHYSICAL THERAPY | Age: 64
Setting detail: THERAPIES SERIES
Discharge: HOME | End: 2019-03-04
Payer: COMMERCIAL

## 2019-03-04 PROCEDURE — 97110 THERAPEUTIC EXERCISES: CPT

## 2019-03-04 PROCEDURE — 97140 MANUAL THERAPY 1/> REGIONS: CPT

## 2019-03-04 PROCEDURE — 97016 VASOPNEUMATIC DEVICE THERAPY: CPT

## 2019-03-05 ENCOUNTER — HOSPITAL ENCOUNTER (OUTPATIENT)
Dept: PHYSICAL THERAPY | Age: 64
Setting detail: THERAPIES SERIES
End: 2019-03-05
Payer: COMMERCIAL

## 2019-03-07 ENCOUNTER — OFFICE VISIT (OUTPATIENT)
Dept: ORTHOPEDIC SURGERY | Age: 64
End: 2019-03-07

## 2019-03-07 ENCOUNTER — HOSPITAL ENCOUNTER (OUTPATIENT)
Dept: PHYSICAL THERAPY | Age: 64
Setting detail: THERAPIES SERIES
Discharge: HOME | End: 2019-03-07
Payer: COMMERCIAL

## 2019-03-07 VITALS — WEIGHT: 188.93 LBS | HEIGHT: 63 IN | BODY MASS INDEX: 33.48 KG/M2

## 2019-03-07 DIAGNOSIS — Z98.890 STATUS POST ROTATOR CUFF REPAIR: Primary | ICD-10-CM

## 2019-03-07 PROCEDURE — 97110 THERAPEUTIC EXERCISES: CPT

## 2019-03-07 PROCEDURE — 97016 VASOPNEUMATIC DEVICE THERAPY: CPT

## 2019-03-07 PROCEDURE — 97140 MANUAL THERAPY 1/> REGIONS: CPT

## 2019-03-07 PROCEDURE — 99024 POSTOP FOLLOW-UP VISIT: CPT | Performed by: ORTHOPAEDIC SURGERY

## 2019-03-07 RX ORDER — IBUPROFEN 800 MG/1
800 TABLET ORAL 3 TIMES DAILY PRN
Qty: 120 TABLET | Refills: 0 | Status: SHIPPED | OUTPATIENT
Start: 2019-03-07

## 2019-03-11 ENCOUNTER — HOSPITAL ENCOUNTER (OUTPATIENT)
Dept: PHYSICAL THERAPY | Age: 64
Setting detail: THERAPIES SERIES
Discharge: HOME | End: 2019-03-11
Payer: COMMERCIAL

## 2019-03-11 PROCEDURE — 97140 MANUAL THERAPY 1/> REGIONS: CPT

## 2019-03-11 PROCEDURE — 97110 THERAPEUTIC EXERCISES: CPT

## 2019-03-11 PROCEDURE — 97016 VASOPNEUMATIC DEVICE THERAPY: CPT

## 2019-03-12 ENCOUNTER — HOSPITAL ENCOUNTER (OUTPATIENT)
Dept: PHYSICAL THERAPY | Age: 64
Setting detail: THERAPIES SERIES
Discharge: HOME | End: 2019-03-12
Payer: COMMERCIAL

## 2019-03-12 PROCEDURE — 97140 MANUAL THERAPY 1/> REGIONS: CPT

## 2019-03-12 PROCEDURE — 97016 VASOPNEUMATIC DEVICE THERAPY: CPT

## 2019-03-12 PROCEDURE — 97110 THERAPEUTIC EXERCISES: CPT

## 2019-03-14 ENCOUNTER — HOSPITAL ENCOUNTER (OUTPATIENT)
Dept: PHYSICAL THERAPY | Age: 64
Setting detail: THERAPIES SERIES
Discharge: HOME | End: 2019-03-14
Payer: COMMERCIAL

## 2019-03-14 PROCEDURE — G0283 ELEC STIM OTHER THAN WOUND: HCPCS

## 2019-03-14 PROCEDURE — 97140 MANUAL THERAPY 1/> REGIONS: CPT

## 2019-03-14 PROCEDURE — 97110 THERAPEUTIC EXERCISES: CPT

## 2019-03-14 ASSESSMENT — PAIN DESCRIPTION - LOCATION: LOCATION: SHOULDER

## 2019-03-14 ASSESSMENT — PAIN DESCRIPTION - ORIENTATION: ORIENTATION: LEFT

## 2019-03-14 ASSESSMENT — PAIN SCALES - GENERAL: PAINLEVEL_OUTOF10: 4

## 2019-03-14 ASSESSMENT — PAIN DESCRIPTION - PAIN TYPE: TYPE: SURGICAL PAIN

## 2019-03-18 ENCOUNTER — HOSPITAL ENCOUNTER (OUTPATIENT)
Dept: PHYSICAL THERAPY | Age: 64
Setting detail: THERAPIES SERIES
Discharge: HOME | End: 2019-03-18
Payer: COMMERCIAL

## 2019-03-18 PROCEDURE — 97016 VASOPNEUMATIC DEVICE THERAPY: CPT

## 2019-03-18 PROCEDURE — 97140 MANUAL THERAPY 1/> REGIONS: CPT

## 2019-03-18 PROCEDURE — G0283 ELEC STIM OTHER THAN WOUND: HCPCS

## 2019-03-18 PROCEDURE — 97110 THERAPEUTIC EXERCISES: CPT

## 2019-03-18 ASSESSMENT — PAIN SCALES - GENERAL: PAINLEVEL_OUTOF10: 4

## 2019-03-18 ASSESSMENT — PAIN DESCRIPTION - LOCATION: LOCATION: SHOULDER

## 2019-03-18 ASSESSMENT — PAIN DESCRIPTION - PAIN TYPE: TYPE: SURGICAL PAIN

## 2019-03-18 ASSESSMENT — PAIN DESCRIPTION - ORIENTATION: ORIENTATION: LEFT

## 2019-03-21 ENCOUNTER — APPOINTMENT (OUTPATIENT)
Dept: PHYSICAL THERAPY | Age: 64
End: 2019-03-21
Payer: COMMERCIAL

## 2019-03-21 ENCOUNTER — HOSPITAL ENCOUNTER (OUTPATIENT)
Dept: PHYSICAL THERAPY | Age: 64
Setting detail: THERAPIES SERIES
Discharge: HOME | End: 2019-03-21
Payer: COMMERCIAL

## 2019-03-21 PROCEDURE — 97110 THERAPEUTIC EXERCISES: CPT

## 2019-03-21 PROCEDURE — 97016 VASOPNEUMATIC DEVICE THERAPY: CPT

## 2019-03-21 PROCEDURE — 97140 MANUAL THERAPY 1/> REGIONS: CPT

## 2019-03-21 ASSESSMENT — PAIN DESCRIPTION - ORIENTATION: ORIENTATION: LEFT;ANTERIOR

## 2019-03-21 ASSESSMENT — PAIN DESCRIPTION - DIRECTION: RADIATING_TOWARDS: LEFT ELBOW

## 2019-03-21 ASSESSMENT — PAIN SCALES - GENERAL: PAINLEVEL_OUTOF10: 2

## 2019-03-21 ASSESSMENT — PAIN DESCRIPTION - PAIN TYPE: TYPE: SURGICAL PAIN

## 2019-03-21 ASSESSMENT — PAIN DESCRIPTION - LOCATION: LOCATION: SHOULDER

## 2019-03-25 ENCOUNTER — HOSPITAL ENCOUNTER (OUTPATIENT)
Dept: PHYSICAL THERAPY | Age: 64
Setting detail: THERAPIES SERIES
Discharge: HOME | End: 2019-03-25
Payer: COMMERCIAL

## 2019-03-25 PROCEDURE — 97110 THERAPEUTIC EXERCISES: CPT

## 2019-03-25 PROCEDURE — 97140 MANUAL THERAPY 1/> REGIONS: CPT

## 2019-03-25 PROCEDURE — 97016 VASOPNEUMATIC DEVICE THERAPY: CPT

## 2019-03-25 ASSESSMENT — PAIN DESCRIPTION - DESCRIPTORS: DESCRIPTORS: ACHING

## 2019-03-25 ASSESSMENT — PAIN DESCRIPTION - FREQUENCY: FREQUENCY: INTERMITTENT

## 2019-03-25 ASSESSMENT — PAIN DESCRIPTION - PAIN TYPE: TYPE: SURGICAL PAIN

## 2019-03-25 ASSESSMENT — PAIN - FUNCTIONAL ASSESSMENT: PAIN_FUNCTIONAL_ASSESSMENT: PREVENTS OR INTERFERES SOME ACTIVE ACTIVITIES AND ADLS

## 2019-03-25 ASSESSMENT — PAIN DESCRIPTION - ORIENTATION: ORIENTATION: LEFT;ANTERIOR

## 2019-03-25 ASSESSMENT — PAIN SCALES - GENERAL: PAINLEVEL_OUTOF10: 3

## 2019-03-25 ASSESSMENT — PAIN DESCRIPTION - LOCATION: LOCATION: SHOULDER

## 2019-03-28 ENCOUNTER — APPOINTMENT (OUTPATIENT)
Dept: PHYSICAL THERAPY | Age: 64
End: 2019-03-28
Payer: COMMERCIAL

## 2019-03-28 ENCOUNTER — HOSPITAL ENCOUNTER (OUTPATIENT)
Dept: PHYSICAL THERAPY | Age: 64
Setting detail: THERAPIES SERIES
Discharge: HOME | End: 2019-03-28
Payer: COMMERCIAL

## 2019-03-28 PROCEDURE — 97016 VASOPNEUMATIC DEVICE THERAPY: CPT

## 2019-03-28 PROCEDURE — 97110 THERAPEUTIC EXERCISES: CPT

## 2019-03-28 PROCEDURE — 97140 MANUAL THERAPY 1/> REGIONS: CPT

## 2019-03-28 ASSESSMENT — PAIN DESCRIPTION - LOCATION: LOCATION: SHOULDER

## 2019-03-28 ASSESSMENT — PAIN DESCRIPTION - FREQUENCY: FREQUENCY: INTERMITTENT

## 2019-03-28 ASSESSMENT — PAIN SCALES - GENERAL: PAINLEVEL_OUTOF10: 1

## 2019-03-28 ASSESSMENT — PAIN DESCRIPTION - PAIN TYPE: TYPE: SURGICAL PAIN

## 2019-03-28 ASSESSMENT — PAIN DESCRIPTION - ORIENTATION: ORIENTATION: LEFT;ANTERIOR

## 2019-03-28 ASSESSMENT — PAIN DESCRIPTION - DESCRIPTORS: DESCRIPTORS: ACHING

## 2019-04-01 ENCOUNTER — HOSPITAL ENCOUNTER (OUTPATIENT)
Dept: PHYSICAL THERAPY | Age: 64
Setting detail: THERAPIES SERIES
Discharge: HOME | End: 2019-04-01
Payer: COMMERCIAL

## 2019-04-01 PROCEDURE — 97016 VASOPNEUMATIC DEVICE THERAPY: CPT

## 2019-04-01 PROCEDURE — 97110 THERAPEUTIC EXERCISES: CPT

## 2019-04-01 ASSESSMENT — PAIN DESCRIPTION - FREQUENCY: FREQUENCY: INTERMITTENT

## 2019-04-01 ASSESSMENT — PAIN DESCRIPTION - DESCRIPTORS: DESCRIPTORS: ACHING

## 2019-04-01 ASSESSMENT — PAIN DESCRIPTION - PAIN TYPE: TYPE: SURGICAL PAIN

## 2019-04-01 ASSESSMENT — PAIN DESCRIPTION - ORIENTATION: ORIENTATION: LEFT;ANTERIOR

## 2019-04-01 ASSESSMENT — PAIN SCALES - GENERAL: PAINLEVEL_OUTOF10: 1

## 2019-04-01 ASSESSMENT — PAIN DESCRIPTION - LOCATION: LOCATION: SHOULDER

## 2019-04-01 NOTE — PROGRESS NOTES
Physical Therapy  Daily Treatment Note  Date: 2019  Patient Name: Nichole Jenikns  MRN: [id number]     :   1955    Subjective:   General  Additional Pertinent Hx: L UE Fx, L 1st Digit Fx  Referring Practitioner: Lizette   PT Visit Information  Onset Date: 17(DOS 19)  PT Insurance Information: Medical [place]   Total # of Visits Approved: 24  Total # of Visits to Date: 12  Plan of Care/Certification Expiration Date: 19  Progress Note Counter: To see  19  Subjective  Subjective: I'm feeling really good. The arm seams to be doing well. I don't have a lot of pain. Pain Screening  Patient Currently in Pain: Yes  Pain Assessment  Pain Assessment: 0-10  Pain Level: 1  Patient's Stated Pain Goal: No pain  Pain Type: Surgical pain  Pain Location: Shoulder  Pain Orientation: Left; Anterior  Pain Descriptors: Aching  Pain Frequency: Intermittent  Non-Pharmaceutical Pain Intervention(S): Ambulation/Increased Activity; Emotional support; Environmental changes; Rest  Response to Pain Intervention: None  Vital Signs  Patient Currently in Pain: Yes       Treatment Activities:   Manual therapy  Joint mobilization: (L) [address] Posterior [place] & Inferior Glide Grade I-II; Scapular thoracic Mobs- Medial/Lateral/Inferior/superior      Balance  Posture: Fair    Exercises  Exercise 1: Pulleys Flex/Scaption 2' each  Exercise 2: Flexion 2 lbs 10 x 3   Exercise 3: Scaption 2 lbs 10 x 3   Exercise 4: Bicep curls 2 lbs  x 3 ways  15 x each   Exercise 5: Shrugs 2 lbs  10 x 4  Exercise 6: ABD 2 lbs 10 x 3   Exercise 7: Supine flexion 2 lbs 10 x 3   Exercise 8: cane stretches 3 min   Exercise 9: side laying ABD/ER 2 lbs 10 x 3   Exercise 10: prone flex/Chooz Abd 10 x 3    Manual therapy           Assessment:   Conditions Requiring Skilled Therapeutic Intervention  Body structures, Functions, Activity limitations: Decreased functional mobility ; Decreased ADL status; Decreased ROM; Decreased strength;Decreased high-level IADLs; Decreased fine motor control;Decreased coordination; Increased Pain  Treatment Diagnosis: Left arm weakness   Prognosis: Good  Patient Education: HEP see above   REQUIRES PT FOLLOW UP: Yes  Activity Tolerance  Activity Tolerance: Patient Tolerated treatment well     Goals:  Short term goals  Time Frame for Short term goals: 4 weeks   Short term goal 1: OPTIMAL score 15/3 at the time of her evaluation, normal functions 3/3, her goal is 9/3. (Met )  Short term goal 2: Worst pain is 6/10,her goal is 0/10. Short term goal 3: Independent with her home program. (Met)  Long term goals  Time Frame for Long term goals : 8 weeks   Long term goal 1: OPTIMAL score of 6/3 at the time of her discharge.    Patient Goals   Patient goals : Use left arm for functional tasks     Plan:      Continue   Timed Code Treatment Minutes: 45 Minutes  Total Treatment Time: 45  Frequency and duration of TX  Days: 2  Weeks: 6     Therapy Time   Individual Concurrent Group Co-treatment   Time In  1400         Time Out  1445         Minutes  45 min   Therx          Timed Code Treatment Minutes: [address], PT

## 2019-04-04 ENCOUNTER — HOSPITAL ENCOUNTER (OUTPATIENT)
Dept: PHYSICAL THERAPY | Age: 64
Setting detail: THERAPIES SERIES
Discharge: HOME | End: 2019-04-04
Payer: COMMERCIAL

## 2019-04-04 PROCEDURE — 97110 THERAPEUTIC EXERCISES: CPT

## 2019-04-04 PROCEDURE — 97016 VASOPNEUMATIC DEVICE THERAPY: CPT

## 2019-04-04 ASSESSMENT — PAIN DESCRIPTION - ORIENTATION: ORIENTATION: LEFT;ANTERIOR

## 2019-04-04 ASSESSMENT — PAIN DESCRIPTION - LOCATION: LOCATION: SHOULDER

## 2019-04-04 ASSESSMENT — PAIN DESCRIPTION - PAIN TYPE: TYPE: SURGICAL PAIN

## 2019-04-04 ASSESSMENT — PAIN DESCRIPTION - FREQUENCY: FREQUENCY: INTERMITTENT

## 2019-04-04 ASSESSMENT — PAIN SCALES - GENERAL: PAINLEVEL_OUTOF10: 2

## 2019-04-04 ASSESSMENT — PAIN DESCRIPTION - DESCRIPTORS: DESCRIPTORS: ACHING

## 2019-04-04 NOTE — PROGRESS NOTES
Physical Therapy  Daily Treatment Note  Date: 2019  Patient Name: Gerda Mo  MRN: [id number]     :   1955    Subjective:   General  Additional Pertinent Hx: L UE Fx, L 1st Digit Fx  Referring Practitioner: Lizette   PT Visit Information  Onset Date: 17(DOS 19)  PT Insurance Information: Medical [place]   Total # of Visits Approved: 24  Total # of Visits to Date: 16  Plan of Care/Certification Expiration Date: 19  Progress Note Counter: To see  19  Subjective  Subjective: doing well nor no complaints   Pain Screening  Patient Currently in Pain: Yes  Pain Assessment  Pain Assessment: 0-10  Pain Level: 2  Patient's Stated Pain Goal: No pain  Pain Type: Surgical pain  Pain Location: Shoulder  Pain Orientation: Left; Anterior  Pain Descriptors: Aching  Pain Frequency: Intermittent  Response to Pain Intervention: None  Vital Signs  Patient Currently in Pain: Yes       Treatment Activities:   Manual therapy  Joint mobilization: (L) [address] Posterior [place] & Inferior Glide Grade I-II; Scapular thoracic Mobs- Medial/Lateral/Inferior/superior      Balance  Posture: Fair  PROM LUE (degrees)  L Shoulder Flex  0-170: 0-145  L Shoulder Ext  0-45: 0-20  L Shoulder ABduction 0-140: 0-120  L Shoulder Int Rotation  0-70: 0-55  L Shoulder Ext Rotation  0-90: 0-75  L Elbow Flex/Ext 0-145: 0-125       Exercises  Exercise 1: Pulleys Flex/Scaption 2' each  Exercise 2: Flexion 2 lbs 10 x 3   Exercise 3: Scaption 2 lbs 10 x 3   Exercise 4: Bicep curls 2 lbs  x 3 ways  15 x each   Exercise 5: Shrugs 2 lbs  10 x 4  Exercise 6: ABD 2 lbs 10 x 3   Exercise 7: Supine flexion 2 lbs 10 x 3   Exercise 8: cane stretches 3 min   Exercise 9: side laying ABD/ER 2 lbs 10 x 3   Exercise 10: prone flex/hoz Abd 10 x 3         Manual therapy  Joint mobilization: (L) [facility] Posterior Glide & Inferior Glide Grade I-II; Scapular thoracic Mobs- Medial/Lateral/Inferior/superior                          Assessment:   Conditions Requiring

## 2019-04-08 ENCOUNTER — HOSPITAL ENCOUNTER (OUTPATIENT)
Dept: PHYSICAL THERAPY | Age: 64
Setting detail: THERAPIES SERIES
Discharge: HOME | End: 2019-04-08
Payer: COMMERCIAL

## 2019-04-08 PROCEDURE — 97110 THERAPEUTIC EXERCISES: CPT

## 2019-04-08 ASSESSMENT — PAIN DESCRIPTION - ORIENTATION: ORIENTATION: LEFT;ANTERIOR

## 2019-04-08 ASSESSMENT — PAIN DESCRIPTION - DESCRIPTORS: DESCRIPTORS: ACHING

## 2019-04-08 ASSESSMENT — PAIN SCALES - GENERAL: PAINLEVEL_OUTOF10: 2

## 2019-04-08 ASSESSMENT — PAIN DESCRIPTION - PAIN TYPE: TYPE: SURGICAL PAIN

## 2019-04-08 ASSESSMENT — PAIN DESCRIPTION - FREQUENCY: FREQUENCY: INTERMITTENT

## 2019-04-08 ASSESSMENT — PAIN DESCRIPTION - LOCATION: LOCATION: SHOULDER

## 2019-04-08 NOTE — PROGRESS NOTES
[address]   Outpatient Physical Therapy  [address] [facility]. [address]  Phone: [phone]  Fax: [phone]  Daily Progress Note    Date: 19    Patient Name: Jordan Blake        MRN: [id number]  Account: [de-identified] : 1955      General Information:  Additional Pertinent Hx: L UE Fx, L 1st Digit Fx  Referring Practitioner: Lizette   Referral Date : 19  Diagnosis: Complete tear left Rotator cuff (S/P L Shlr athroscopic RTC repair w/ bicep tenodesis 19)  Onset Date: 17(DOS 19)  PT Insurance Information: Medical [place]   Total # of Visits Approved: 24  Total # of Visits to Date: 25  Plan of Care/Certification Expiration Date: 19  Progress Note Counter: To see  19    Subjective:  Subjective: doing well nor no complaints      Pain:  Patient Currently in Pain: Yes  Pain Assessment: 0-10  Pain Level: 2  Pain Type: Surgical pain  Pain Location: Shoulder  Pain Orientation: Left; Anterior  Pain Descriptors: Aching  Pain Frequency: Intermittent  Response to Pain Intervention: None       Objective:  Exercise 1: Pulleys Flex/Scaption 2' each  Exercise 2: Flexion 2 lbs 10 x 3   Exercise 3: Scaption 2 lbs 10 x 3   Exercise 4: Bicep curls 2 lbs  x 3 ways  15 x each   Exercise 5: Shrugs 2 lbs  10 x 4  Exercise 6: ABD 2 lbs 10 x 3   Exercise 7: Supine flexion 2 lbs 10 x 3   Exercise 8: cane streches 3 min   Exercise 9: side laying ABD/ER 2 lbs 10 x 3   Exercise 10: prone flex/hoz Abd 10 x 3   Joint mobilization: (L) [facility] Posterior Glide & Inferior Glide Grade I-II; Scapular thoracic Mobs- Medial/Lateral/Inferior/superior    Assessment: Body structures, Functions, Activity limitations: Decreased functional mobility ; Decreased ADL status; Decreased ROM; Decreased strength;Decreased high-level IADLs;Decreased fine motor control;Decreased coordination; Increased Pain  Treatment Diagnosis: Left arm weakness   Prognosis: Good  Patient Education: HEP see above   REQUIRES PT FOLLOW UP: Yes  Activity Tolerance: Patient Tolerated treatment well    Plan:  Plan: Continue with current plan    Therapy Time:  Time In: 1400  Time Out: 1450  Minutes: 50  Timed Code Treatment Minutes: 45 Minutes    Treatment Charges: Minutes Units   []  Ultrasound     []  Electrical-Stim     []  Iontophoresis     []  Traction     []  Massage       []  Eval     []  Gait     []  Vasopneumatic Device     [x]  Ther Exercise 45  3   []  Manual Therapy       []  Ther Activities       []  Aquatics     []  Neuro Re-Ed       []  Other       Total Treatment Time: 39 3       Sayda Parker PTA

## 2019-04-11 ENCOUNTER — HOSPITAL ENCOUNTER (OUTPATIENT)
Dept: PHYSICAL THERAPY | Age: 64
Setting detail: THERAPIES SERIES
Discharge: HOME | End: 2019-04-11
Payer: COMMERCIAL

## 2019-04-11 PROCEDURE — 97110 THERAPEUTIC EXERCISES: CPT

## 2019-04-11 ASSESSMENT — PAIN DESCRIPTION - FREQUENCY: FREQUENCY: INTERMITTENT

## 2019-04-11 ASSESSMENT — PAIN DESCRIPTION - LOCATION: LOCATION: SHOULDER

## 2019-04-11 ASSESSMENT — PAIN SCALES - GENERAL: PAINLEVEL_OUTOF10: 1

## 2019-04-11 ASSESSMENT — PAIN DESCRIPTION - DESCRIPTORS: DESCRIPTORS: ACHING

## 2019-04-11 ASSESSMENT — PAIN DESCRIPTION - ORIENTATION: ORIENTATION: LEFT;ANTERIOR

## 2019-04-11 ASSESSMENT — PAIN DESCRIPTION - PAIN TYPE: TYPE: SURGICAL PAIN

## 2019-04-11 NOTE — PROGRESS NOTES
Physical Therapy  Daily Treatment Note  Date: 2019  Patient Name: Jordan Blake  MRN: [id number]     :   1955    Subjective:   General  Additional Pertinent Hx: L UE Fx, L 1st Digit Fx  Referring Practitioner: Lizette   PT Visit Information  Onset Date: 17(DOS 19)  PT Insurance Information: Medical [place]   Total # of Visits Approved: 24  Total # of Visits to Date: 23  Plan of Care/Certification Expiration Date: 19  Progress Note Counter: To see Dr 19  Subjective  Subjective: No new complaints, function improving well. Mild weakness with raising her left UE overhead. Pain Screening  Patient Currently in Pain: Yes  Pain Assessment  Pain Assessment: 0-10  Pain Level: 1  Patient's Stated Pain Goal: No pain  Pain Type: Surgical pain  Pain Location: Shoulder  Pain Orientation: Left; Anterior  Pain Descriptors: Aching  Pain Frequency: Intermittent  Response to Pain Intervention: None  Vital Signs  Patient Currently in Pain: Yes       Treatment Activities:   Manual therapy  Joint mobilization: (L) [address] Posterior [place] & Inferior Glide Grade I-II; Scapular thoracic Mobs- Medial/Lateral/Inferior/superior      Balance  Posture: Fair  PROM LUE (degrees)  L Shoulder Flex  0-170: 0-145  L Shoulder Ext  0-45: 0-20  L Shoulder ABduction 0-140: 0-120  L Shoulder Int Rotation  0-70: 0-55  L Shoulder Ext Rotation  0-90: 0-75  L Elbow Flex/Ext 0-145: 0-125       Exercises  Exercise 1: Pulleys Flex/Scaption 2' each  Exercise 2: Flexion 2 lbs 10 x 3   Exercise 3: Scaption 2 lbs 10 x 3   Exercise 4: Bicep curls 2 lbs  x 3 ways  15 x each   Exercise 5: Shrugs 2 lbs  10 x 4  Exercise 6: ABD 2 lbs 10 x 3   Exercise 7: Supine flexion 2 lbs 10 x 3   Exercise 8: cane stretches 3 min   Exercise 9: side laying ABD/ER 2 lbs 10 x 3   Exercise 10: prone flex/hoz Abd 10 x 3   Exercise 11: HEP - T band rows, ER, Flex yellow 15 x 2         Assessment:   Conditions Requiring Skilled Therapeutic Intervention  Body

## 2019-04-15 ENCOUNTER — HOSPITAL ENCOUNTER (OUTPATIENT)
Dept: PHYSICAL THERAPY | Age: 64
Setting detail: THERAPIES SERIES
Discharge: HOME | End: 2019-04-15
Payer: COMMERCIAL

## 2019-04-15 PROCEDURE — 97110 THERAPEUTIC EXERCISES: CPT

## 2019-04-15 NOTE — PROGRESS NOTES
[address]   Outpatient Physical Therapy  [address] [facility]  Phone: [phone]  Fax: [phone]  Daily Progress Note    Date: 4/15/19    Patient Name: Sarah Beard        MRN: [id number]  Account: [de-identified] : 1955      General Information:  Additional Pertinent Hx: L UE Fx, L 1st Digit Fx  Referring Practitioner: Lizette   Referral Date : 19  Diagnosis: Complete tear left Rotator cuff (S/P L Shlr athroscopic RTC repair w/ bicep tenodesis 19)  Onset Date: 17(DOS 19)  PT Insurance Information: Medical [place]   Total # of Visits Approved: 24  Total # of Visits to Date: 20  Plan of Care/Certification Expiration Date: 19  Progress Note Counter: To see  19    Subjective:  Subjective: No new complaints, function improving well. Mild weakness with raising her left UE overhead. Pain:  Patient Currently in Pain: Denies  Response to Pain Intervention: None       Objective:  Exercise 1: Pulleys Flex/Scaption 2' each  Exercise 2: Flexion 2 lbs 10 x 3   Exercise 3: Scaption 2 lbs 10 x 3   Exercise 4: Bicep curls 2 lbs  x 3 ways  15 x each   Exercise 5: Shrugs 2 lbs  10 x 4  Exercise 6: ABD 2 lbs 10 x 3   Exercise 7: Supine flexion 2 lbs 10 x 3   Exercise 8: cane streches 3 min   Exercise 9: side laying ABD/ER 2 lbs 10 x 3   Exercise 10: prone flex/hoz Abd 10 x 3   Exercise 11: HEP - T band rows, ER, Flex yellow 15 x 2   Joint mobilization: (L) [facility] Posterior Glide & Inferior Glide Grade I-II; Scapular thoracic Mobs- Medial/Lateral/Inferior/superior    Assessment: Body structures, Functions, Activity limitations: Decreased functional mobility ; Decreased ADL status; Decreased ROM; Decreased strength;Decreased high-level IADLs;Decreased fine motor control;Decreased coordination; Increased Pain  Assessment: Patient request to hold vasocompression this date due to personal time restriction.    Treatment Diagnosis: Left arm weakness   Prognosis: Good  Patient Education: HEP see above   REQUIRES PT FOLLOW UP: Yes  Activity Tolerance: Patient Tolerated treatment well    Plan:  Plan: Continue with current plan    Therapy Time:  Time In: 1350  Time Out: 1429  Minutes: 39  Timed Code Treatment Minutes: 39 Minutes    Treatment Charges: Minutes Units   []  Ultrasound     []  Electrical-Stim     []  Iontophoresis     []  Traction     []  Massage       []  Eval     []  Gait     []  Vasopneumatic Device     [x]  Ther Exercise  39 3   []  Manual Therapy       []  Ther Activities       []  Aquatics     []  Neuro Re-Ed       []  Other       Total Treatment Time: 44 3       Ambrocio Mins, PTA

## 2019-04-18 ENCOUNTER — HOSPITAL ENCOUNTER (OUTPATIENT)
Dept: PHYSICAL THERAPY | Age: 64
Setting detail: THERAPIES SERIES
Discharge: HOME | End: 2019-04-18
Payer: COMMERCIAL

## 2019-04-18 PROCEDURE — 97016 VASOPNEUMATIC DEVICE THERAPY: CPT

## 2019-04-18 PROCEDURE — 97110 THERAPEUTIC EXERCISES: CPT

## 2019-04-18 ASSESSMENT — PAIN DESCRIPTION - PAIN TYPE: TYPE: SURGICAL PAIN

## 2019-04-18 ASSESSMENT — PAIN DESCRIPTION - LOCATION: LOCATION: ARM;SHOULDER

## 2019-04-18 ASSESSMENT — PAIN DESCRIPTION - ORIENTATION: ORIENTATION: LEFT;ANTERIOR

## 2019-04-18 ASSESSMENT — PAIN SCALES - GENERAL: PAINLEVEL_OUTOF10: 2

## 2019-04-18 NOTE — PROGRESS NOTES
Physical Therapy  Daily Treatment Note  Date: 2019  Patient Name: Gerda Mo  MRN: [id number]     :   1955    Subjective:   General  Additional Pertinent Hx: L UE Fx, L 1st Digit Fx  Referring Practitioner: Lizette   PT Visit Information  Onset Date: 17(DOS 19)  PT Insurance Information: Medical [place]   Total # of Visits Approved: 24  Total # of Visits to Date: 21  Plan of Care/Certification Expiration Date: 19  Progress Note Counter: To See Dr Blake Coles 19  Subjective  Subjective: A little mor achy, doing a   Pain Screening  Patient Currently in Pain: Yes  Pain Assessment  Pain Assessment: 0-10  Pain Level: 2  Patient's Stated Pain Goal: No pain  Pain Type: Surgical pain  Pain Location: Arm; Shoulder  Pain Orientation: Left; Anterior  Response to Pain Intervention: None  Vital Signs  Patient Currently in Pain: Yes       Treatment Activities:   Manual therapy  Joint mobilization: (L) [facility] Posterior Glide & Inferior Glide Grade I-II; Scapular thoracic Mobs- Medial/Lateral/Inferior/superior      Balance  Posture: Fair  PROM LUE (degrees)  L Shoulder Flex  0-170: 0-145  L Shoulder Ext  0-45: 0-20  L Shoulder ABduction 0-140: 0-120  L Shoulder Int Rotation  0-70: 0-55  L Shoulder Ext Rotation  0-90: 0-75  L Elbow Flex/Ext 0-145: 0-125       Exercises  Exercise 1: Pulleys Flex/Scaption 2' each  Exercise 2: Flexion 2 lbs 10 x 3   Exercise 3: Scaption 2 lbs 10 x 3   Exercise 4: Bicep curls 2 lbs  x 3 ways  15 x each   Exercise 5: Shrugs 2 lbs  10 x 4  Exercise 6: ABD 2 lbs 10 x 3   Exercise 7: Supine flexion 2 lbs 10 x 3   Exercise 9: side laying ABD/ER 2 lbs 10 x 3   Exercise 10: prone flex/hoz Abd 10 x 3   Exercise 11: HEP - T band rows, ER, Flex yellow 15 x 2         Manual therapy  Joint mobilization: (L) [facility] Posterior Glide & Inferior Glide Grade I-II; Scapular thoracic Mobs- Medial/Lateral/Inferior/superior      Assessment:   Conditions Requiring Skilled Therapeutic Intervention  Body

## 2019-04-22 ENCOUNTER — HOSPITAL ENCOUNTER (OUTPATIENT)
Dept: PHYSICAL THERAPY | Age: 64
Setting detail: THERAPIES SERIES
Discharge: HOME | End: 2019-04-22
Payer: COMMERCIAL

## 2019-04-22 PROCEDURE — 97016 VASOPNEUMATIC DEVICE THERAPY: CPT

## 2019-04-22 PROCEDURE — 97110 THERAPEUTIC EXERCISES: CPT

## 2019-04-22 ASSESSMENT — PAIN DESCRIPTION - ORIENTATION: ORIENTATION: LEFT;ANTERIOR

## 2019-04-22 ASSESSMENT — PAIN DESCRIPTION - PAIN TYPE: TYPE: SURGICAL PAIN

## 2019-04-22 ASSESSMENT — PAIN SCALES - GENERAL: PAINLEVEL_OUTOF10: 1

## 2019-04-22 ASSESSMENT — PAIN DESCRIPTION - LOCATION: LOCATION: ARM;SHOULDER

## 2019-04-22 NOTE — PROGRESS NOTES
Therapeutic Intervention  Body structures, Functions, Activity limitations: Decreased functional mobility ; Decreased ADL status; Decreased ROM; Decreased strength;Decreased high-level IADLs;Decreased fine motor control;Decreased coordination; Increased Pain  Assessment: doing well re-eval next visit. Will plan discharge depending on her functional evels. Treatment Diagnosis: Left arm weakness   Prognosis: Good  Patient Education: HEP see above   REQUIRES PT FOLLOW UP: Yes  Discharge Recommendations: Home independently  Activity Tolerance  Activity Tolerance: Patient Tolerated treatment well      Goals:  Short term goals  Time Frame for Short term goals: 4 weeks   Short term goal 1: OPTIMAL score 15/3 at the time of her evaluation, normal functions 3/3, her goal is 9/3. (Met )  Short term goal 2: Worst pain is 6/10,her goal is 0/10. Short term goal 3: Independent with her home program. (Met)  Long term goals  Time Frame for Long term goals : 8 weeks   Long term goal 1: OPTIMAL score of 6/3 at the time of her discharge.    Patient Goals   Patient goals : Use left arm for functional tasks     Plan:     One more visit and discharge to independent home program.   Timed Code Treatment Minutes: 30 Minutes  Total Treatment Time: 40  Frequency and duration of TX  Days: 2  Weeks: 6     Therapy Time   Individual Concurrent Group Co-treatment   Time In  1400         Time Out  1430         Minutes  30 min Total   15 min  Therx  15 min VC            Timed Code Treatment Minutes: [address] Orval Gum, PT

## 2019-04-23 ENCOUNTER — OFFICE VISIT (OUTPATIENT)
Dept: ORTHOPEDIC SURGERY | Age: 64
End: 2019-04-23

## 2019-04-23 DIAGNOSIS — Z98.890 STATUS POST ROTATOR CUFF REPAIR: Primary | ICD-10-CM

## 2019-04-23 PROCEDURE — 99024 POSTOP FOLLOW-UP VISIT: CPT | Performed by: ORTHOPAEDIC SURGERY

## 2019-04-25 ENCOUNTER — HOSPITAL ENCOUNTER (OUTPATIENT)
Dept: PHYSICAL THERAPY | Age: 64
Setting detail: THERAPIES SERIES
Discharge: HOME | End: 2019-04-25
Payer: COMMERCIAL

## 2019-04-25 PROCEDURE — 97110 THERAPEUTIC EXERCISES: CPT

## 2019-04-25 ASSESSMENT — PAIN DESCRIPTION - ORIENTATION: ORIENTATION: LEFT;ANTERIOR

## 2019-04-25 ASSESSMENT — PAIN SCALES - GENERAL: PAINLEVEL_OUTOF10: 1

## 2019-04-25 ASSESSMENT — PAIN DESCRIPTION - LOCATION: LOCATION: ARM;SHOULDER

## 2019-04-25 ASSESSMENT — PAIN DESCRIPTION - PAIN TYPE: TYPE: SURGICAL PAIN

## 2019-04-25 NOTE — PROGRESS NOTES
Procedure: Left shoulder arthroscopic rotator cuff repair  Date of procedure: 1/21/19    HPI: Tobias Osorio is a 61 y.o. female who is approximately 3 months status post the aforementioned procedure. She indicates that she continues to do well. She denies having any pain at this point and states that she sleeps on that side as well. She  has continued on with PT which is reportedly going well. Physical examination:  There were no vitals taken for this visit. Evaluation of patient's left shoulder and upper extremity demonstrates her incisions to be healed appropriately. There is minimal to no swelling at this time. Sensation is grossly intact light touch in all dermatomes and she has a 2+ radial pulse. ROM: (Degrees)    Right   A P   Left   A P    Elevation  150    Elevation  150   Abduction  150    Abduction  130    ER   75    ER   75   IR   T10    IR   T12   90 abd/ER      90 abd/ER     90 abd/IR      90 abd/IR     Crepitation  No    Crepitation No      Muscle strength:    Right       Left    Deltoid   5    Deltoid   5  Supraspinatus  5    Supraspinatus  5  ER   5    ER   5  IR   5    IR   5    Special tests    Right   Rotator Cuff    Left    n   Painful arc    n   n   Pain with ER    n    n   Neer's     n    n   Hawkin's    n    n   Drop Arm    n  n   Lift off/Belly Press   n  n   ER Lag    n      Impression and plan:Lori Cartagena is a 61 y.o. female  who is approximately 3 months status post left shoulder arthroscopic rotator cuff repair. She is doing relatively well at this time. She may continue to use the extremity for light activities of daily living not to exceed lifting, pushing or pulling more than 2-3 pounds. She is to continue to work in PT on ROM and gradual progressive RTC and scapular stabilizer strengthening. I will see her back my clinic in 3 months for reevaluation but she was encouraged to return or call earlier with questions and/or concerns.

## 2019-04-25 NOTE — PROGRESS NOTES
this time to home program.   Treatment Diagnosis: Left arm weakness   Prognosis: Good  Patient Education: HEP see above   REQUIRES PT FOLLOW UP:No  Discharge Recommendations: Home independently  Activity Tolerance  Activity Tolerance: Patient Tolerated treatment well        Goals:  Short term goals  Time Frame for Short term goals: 4 weeks   Short term goal 1: OPTIMAL score 15/3 at the time of her evaluation, normal functions 3/3, her goal is 9/3. (Met )  Short term goal 2: Worst pain is 6/10,her goal is 0/10. (Not Met )  Short term goal 3: Independent with her home program. (Met)  Long term goals  Time Frame for Long term goals : 8 weeks   Long term goal 1: OPTIMAL score of 6/3 at the time of her discharge.  (Met )  Patient Goals   Patient goals : Use left arm for functional tasks (Met)    Plan:     Discharge   Timed Code Treatment Minutes: 30 Minutes  Total Treatment Time: 30  Frequency and duration of TX  Days: 2  Weeks: 6     Therapy Time   Individual Concurrent Group Co-treatment   Time In  9019         Time Out  1515         Minutes  30 min   Therx          Timed Code Treatment Minutes: [address] Tobias Poole, PT

## 2019-07-23 ENCOUNTER — OFFICE VISIT (OUTPATIENT)
Dept: ORTHOPEDIC SURGERY | Age: 64
End: 2019-07-23
Payer: COMMERCIAL

## 2019-07-23 VITALS — HEIGHT: 63 IN | WEIGHT: 182 LBS | BODY MASS INDEX: 32.25 KG/M2

## 2019-07-23 DIAGNOSIS — Z98.890 STATUS POST ROTATOR CUFF REPAIR: ICD-10-CM

## 2019-07-23 DIAGNOSIS — M75.122 COMPLETE TEAR OF LEFT ROTATOR CUFF, UNSPECIFIED WHETHER TRAUMATIC: Primary | ICD-10-CM

## 2019-07-23 PROCEDURE — 99213 OFFICE O/P EST LOW 20 MIN: CPT | Performed by: ORTHOPAEDIC SURGERY

## 2019-07-23 NOTE — PROGRESS NOTES
Procedure: Left shoulder arthroscopic rotator cuff repair  Date of procedure: 1/21/19    HPI: Wilian Taylor is a 61 y.o. female who is approximately 6 months status post the aforementioned procedure. She indicates that she continues to do well. She indicates that she has a rare ache in the shoulder if she overdoes it but otherwise no complaints. She also states that she has not really kept up with her home exercise programs due to being busy. Overall she is happy with her shoulder. Physical examination:  Evaluation of patient's left shoulder and upper extremity demonstrates her incisions to be healed appropriately. There is minimal to no swelling at this time. Sensation is grossly intact light touch in all dermatomes and she has a 2+ radial pulse. ROM: (Degrees)    Right   A P   Left   A P    Elevation  145    Elevation  145 145  Abduction  150    Abduction  135  145  ER   85    ER   85   IR   T10    IR   T10   90 abd/ER      90 abd/ER     90 abd/IR      90 abd/IR     Crepitation  No    Crepitation  No      Muscle strength:    Right       Left    Deltoid   5    Deltoid   5  Supraspinatus  5    Supraspinatus  5  ER   5    ER   5  IR   5    IR   5    Special tests    Right   Rotator Cuff    Left    n   Painful arc    n   n   Pain with ER    n    n   Neer's     n    n   Hawkin's    n    n   Drop Arm    n  n   Lift off/Belly Press   n  n   ER Lag    n      Impression and plan:Lori Yan is a 61 y.o. female  who is approximately 6 months status post left shoulder arthroscopic rotator cuff repair. She is doing relatively well at this time. She can continue to gradually increase her activities as tolerated. She was encouraged to try and keep up with her home exercise program as much as possible. Patient with some weakness extension with abduction I will see her back my clinic in 6 months for reevaluation but she was encouraged to return or call earlier with questions and/or concerns.

## 2020-01-21 ENCOUNTER — OFFICE VISIT (OUTPATIENT)
Dept: ORTHOPEDIC SURGERY | Age: 65
End: 2020-01-21
Payer: COMMERCIAL

## 2020-01-21 VITALS — BODY MASS INDEX: 32.25 KG/M2 | WEIGHT: 182 LBS | HEIGHT: 63 IN

## 2020-01-21 PROCEDURE — 99213 OFFICE O/P EST LOW 20 MIN: CPT | Performed by: ORTHOPAEDIC SURGERY

## 2020-01-21 NOTE — PROGRESS NOTES
Procedure: Left shoulder arthroscopic rotator cuff repair  Date of procedure: 1/21/19    HPI: Eddy Alves is a 59 y.o. female who is approximately 1 year status post the aforementioned procedure. She continues to do very well at this time having no complaints. She is back to all of her regular activities without any deficits. She rates her pain as a 0/10. Physical examination:  Evaluation of patient's left shoulder and upper extremity demonstrates her incisions to be healed appropriately. There is minimal to no swelling at this time. Sensation is grossly intact light touch in all dermatomes and she has a 2+ radial pulse. ROM: (Degrees)    Right   A P   Left   A P    Elevation  150    Elevation  150   Abduction  150    Abduction  150    ER   85    ER   85   IR   T10    IR   T10   90 abd/ER      90 abd/ER     90 abd/IR      90 abd/IR     Crepitation  No    Crepitation  No      Muscle strength:    Right       Left    Deltoid   5    Deltoid   5  Supraspinatus  5    Supraspinatus  5  ER   5    ER   5  IR   5    IR   5    Special tests    Right   Rotator Cuff    Left    n   Painful arc    n   n   Pain with ER    n    n   Neer's     n    n   Hawkin's    n    n   Drop Arm    n  n   Lift off/Belly Press   n  n   ER Lag    n      Impression and plan:Lori Martinez is a 59 y.o. female  who is approximately 1 year status post left shoulder arthroscopic rotator cuff repair. She is doing very well at this time without any stiffness or weakness. At this time she was encouraged to keep up with her home exercise program and she can continue on with activities as tolerated. I will see her back in my clinic as needed but she was encouraged to return or call at anytime with questions and/or concerns.

## 2023-03-17 ENCOUNTER — OFFICE VISIT (OUTPATIENT)
Dept: ORTHOPEDIC SURGERY | Age: 68
End: 2023-03-17
Payer: MEDICARE

## 2023-03-17 DIAGNOSIS — M25.561 RIGHT KNEE PAIN, UNSPECIFIED CHRONICITY: Primary | ICD-10-CM

## 2023-03-17 PROCEDURE — G8427 DOCREV CUR MEDS BY ELIG CLIN: HCPCS | Performed by: ORTHOPAEDIC SURGERY

## 2023-03-17 PROCEDURE — 4004F PT TOBACCO SCREEN RCVD TLK: CPT | Performed by: ORTHOPAEDIC SURGERY

## 2023-03-17 PROCEDURE — 1090F PRES/ABSN URINE INCON ASSESS: CPT | Performed by: ORTHOPAEDIC SURGERY

## 2023-03-17 PROCEDURE — 3017F COLORECTAL CA SCREEN DOC REV: CPT | Performed by: ORTHOPAEDIC SURGERY

## 2023-03-17 PROCEDURE — 1123F ACP DISCUSS/DSCN MKR DOCD: CPT | Performed by: ORTHOPAEDIC SURGERY

## 2023-03-17 PROCEDURE — G8484 FLU IMMUNIZE NO ADMIN: HCPCS | Performed by: ORTHOPAEDIC SURGERY

## 2023-03-17 PROCEDURE — 99203 OFFICE O/P NEW LOW 30 MIN: CPT | Performed by: ORTHOPAEDIC SURGERY

## 2023-03-17 PROCEDURE — G8400 PT W/DXA NO RESULTS DOC: HCPCS | Performed by: ORTHOPAEDIC SURGERY

## 2023-03-17 PROCEDURE — G8421 BMI NOT CALCULATED: HCPCS | Performed by: ORTHOPAEDIC SURGERY

## 2023-03-17 RX ORDER — MELOXICAM 15 MG/1
15 TABLET ORAL DAILY
Qty: 30 TABLET | Refills: 4 | Status: SHIPPED | OUTPATIENT
Start: 2023-03-17

## 2023-03-17 NOTE — PROGRESS NOTES
Tiffani Pablo M.D.            [address] [facility],[address], [address] [facility]           Dept Phone: [phone]           Dept Fax:  [phone]           [place]          Dept Phone: [phone]           Dept Fax:  [phone]      Chief Compliant:  Chief Complaint   Patient presents with    Pain     Rt knee        History of Present Illness: This is a 79 y.o. female who presents to the clinic today for evaluation / follow up of right knee pain. Patient is a 80-year-old female who had previous arthroscopy examination of the right knee per Dr. Meliza Hawkins greater than 25 years ago. I did an arthroscopy of her left knee in the past as well. Patient has some moderate knee pain on the right no recent injury or trauma. She does not take any anti-inflammatories other than occasional Motrin she is never had injections she describes achiness in her knees she just describes difficulties ascending descending stairs and getting out of chairs does not really give a history of locking catching sensation. Review of Systems   Constitutional: Negative for fever, chills, sweats. Eyes: Negative for changes in vision, or pain. HENT: Negative for ear ache, epistaxis, or sore throat. Respiratory/Cardio: Negative for Chest pain, palpitations, SOB, or cough. Gastrointestinal: Negative for abdominal pain, N/V/D. Genitourinary: Negative for dysuria, frequency, urgency, or hematuria. Neurological: Negative for headache, numbness, or weakness. Integumentary: Negative for rash, itching, laceration, or abrasion. Musculoskeletal: Positive for Pain (Rt knee)       Physical Exam:  Constitutional: Patient is oriented to person, place, and time. Patient appears well-developed and well nourished.    HENT: Negative otherwise noted  Head: Normocephalic and Atraumatic  Nose: Normal  Eyes: Conjunctivae and EOM are normal  Neck: Normal range of motion Neck supple. Respiratory/Cardio: Effort normal. No respiratory distress. Musculoskeletal: Examination of the patient's right knee notes a slight effusion knee motion is 5 to about 115 degrees she has crepitus and grinding throughout her motion Severo's is negative ligamentously she is grossly intact some moderate patellofemoral pain with compression and quad activation. No hip rotational pain no trochanteric findings    Neurological: Patient is alert and oriented to person, place, and time. Normal strength. No sensory deficit. Skin: Skin is warm and dry  Psychiatric: Behavior is normal. Thought content normal.  Nursing note and vitals reviewed. Labs and Imaging:     XR taken today:  XR KNEE RIGHT (1-2 VIEWS)    Result Date: 3/17/2023  X-rays taken today reviewed by me show standing AP of both knees and a lateral the right knee. Patient has fairly significant degenerative joint disease of the right knee with approaching bone-on-bone disease medial compartment the right knee as well as peripheral osteophytes of both the medial lateral compartments, normal varus disposition. Lateral view of the right knee also indicates severe patellofemoral arthrosis with superior and inferior pole osteophytes. Patient's left knee is also markedly narrowed although not nearly as severe. No other acute process is noted. Orders Placed This Encounter   Procedures    XR KNEE RIGHT (1-2 VIEWS)     Standing Status:   Future     Number of Occurrences:   1     Standing Expiration Date:   3/13/2024       Assessment and Plan:  Degenerative joint disease both knees right greater than left        This is a 79 y.o. female who presents to the clinic today for evaluation / follow up of generative joint disease both knees right greater than left.      Past History:    Current Outpatient Medications:     meloxicam (MOBIC) 15 MG tablet, Take 1 tablet by mouth daily, Disp: 30 tablet, Rfl: 4    ibuprofen (ADVIL;MOTRIN) 800 MG tablet, Take 1 tablet by mouth 3 times daily as needed for Pain, Disp: 120 tablet, Rfl: 0    ibuprofen (ADVIL;MOTRIN) 800 MG tablet, Take 1 tablet by mouth 3 times daily as needed for Pain, Disp: 30 tablet, Rfl: 0    ondansetron (ZOFRAN) 4 MG tablet, Take 1 tablet by mouth daily as needed for Nausea or Vomiting, Disp: 20 tablet, Rfl: 0  No Known Allergies  Social History     Socioeconomic History    Marital status:       Spouse name: Not on file    Number of children: Not on file    Years of education: Not on file    Highest education level: Not on file   Occupational History    Not on file   Tobacco Use    Smoking status: Never    Smokeless tobacco: Never   Vaping Use    Vaping Use: Never used   Substance and Sexual Activity    Alcohol use: No     Alcohol/week: 0.0 standard drinks    Drug use: No    Sexual activity: Yes     Partners: Male   Other Topics Concern    Not on file   Social History Narrative    Not on file     Social Determinants of Health     Financial Resource Strain: Not on file   Food Insecurity: Not on file   Transportation Needs: Not on file   Physical Activity: Not on file   Stress: Not on file   Social Connections: Not on file   Intimate Partner Violence: Not on file   Housing Stability: Not on file     Past Medical History:   Diagnosis Date    Forearm fracture     Lt     Past Surgical History:   Procedure Laterality Date    CLOSED REDUCTION FINGER DISLOCATION Left 8/1/2018    CLOSED REDUCTION PINNING INDEX FINGER performed by Chavez Casillas MD at [address] [facility] Bilateral     arthroscopy    LAPAROSCOPY      tubal    OPEN [address] Left 8/1/2018    WRIST OPEN REDUCTION INTERNAL FIXATION performed by Chavez Casillas MD at [address] [facility] Left 10/8/2018    SHOULDER MANIPULATION LEFT  & ALL FINGERS LEFT HAND performed by Chavez Casillas MD at [facility]    SHOULDER ARTHROSCOPY Left 1/21/2019    SHOULDER ARTHROSCOPY ROTATOR CUFF REPAIR, AND BICEPS TENODYSIS performed by Mary Mcnulty MD at [facility] History   Problem Relation Age of Onset    Diabetes Father     Hypertension Father     Diabetes Mother     Hypertension Mother    Plan  Patient be scheduled for a started on Mobic 15 mg p.o. daily and see how she does with this I would have her make a tentative appointment to follow-up in 4 weeks because if she does not relieve with any Mobic then have her come in to have an injection. She was advised that she is likely looking at arthroplasty at some point in future but hopefully can put this off as long as possible. Provider Attestation:  Anil Mota, personally performed the services described in this documentation. All medical record entries made by the scribe were at my direction and in my presence. I have reviewed the chart and discharge instructions and agree that the records reflect my personal performance and is accurate and complete. Clinton Leggett MD. 03/17/23      Please note that this chart was generated using voice recognition Dragon dictation software. Although every effort was made to ensure the accuracy of this automated transcription, some errors in transcription may have occurred.

## 2023-05-12 ENCOUNTER — OFFICE VISIT (OUTPATIENT)
Dept: ORTHOPEDIC SURGERY | Age: 68
End: 2023-05-12
Payer: MEDICARE

## 2023-05-12 VITALS — HEIGHT: 63 IN | WEIGHT: 182 LBS | BODY MASS INDEX: 32.25 KG/M2 | RESPIRATION RATE: 14 BRPM

## 2023-05-12 DIAGNOSIS — M25.561 RIGHT KNEE PAIN, UNSPECIFIED CHRONICITY: Primary | ICD-10-CM

## 2023-05-12 PROCEDURE — G8427 DOCREV CUR MEDS BY ELIG CLIN: HCPCS | Performed by: ORTHOPAEDIC SURGERY

## 2023-05-12 PROCEDURE — 1036F TOBACCO NON-USER: CPT | Performed by: ORTHOPAEDIC SURGERY

## 2023-05-12 PROCEDURE — G8400 PT W/DXA NO RESULTS DOC: HCPCS | Performed by: ORTHOPAEDIC SURGERY

## 2023-05-12 PROCEDURE — 1090F PRES/ABSN URINE INCON ASSESS: CPT | Performed by: ORTHOPAEDIC SURGERY

## 2023-05-12 PROCEDURE — 3017F COLORECTAL CA SCREEN DOC REV: CPT | Performed by: ORTHOPAEDIC SURGERY

## 2023-05-12 PROCEDURE — 1123F ACP DISCUSS/DSCN MKR DOCD: CPT | Performed by: ORTHOPAEDIC SURGERY

## 2023-05-12 PROCEDURE — 99212 OFFICE O/P EST SF 10 MIN: CPT | Performed by: ORTHOPAEDIC SURGERY

## 2023-05-12 PROCEDURE — G8417 CALC BMI ABV UP PARAM F/U: HCPCS | Performed by: ORTHOPAEDIC SURGERY

## 2023-05-12 RX ORDER — MELOXICAM 15 MG/1
15 TABLET ORAL DAILY
Qty: 30 TABLET | Refills: 4 | Status: SHIPPED | OUTPATIENT
Start: 2023-05-12

## 2023-05-12 NOTE — PROGRESS NOTES
Bere Sr returns today. She is here for 1 month follow-up of her right knee. She was diagnosed with significant degenerative joint disease of the right knee. She did not wish to have injections right away and so we started her on Mobic 15 mg p.o. daily. Patient states that the Mobic is working marvelously for her that her knee pain is essentially gone and she is doing much better. She is basically here today just to discuss having refills and potential injections in the future. No new examination was carried out today. Please see prior dictations    No new x-rays taken today see previous dictation    Impression  Significant DJD right knee    Plan  Patient is doing extremely well with Mobic and I would have her continue with this. We did give her some refills. She is good to make a tentative appointment for this coming fall in case she happens need injections as she has season tickets with her sister for the [facility] and does not wish to have this affect her attendance.   We will see her back at that time

## 2023-09-15 ENCOUNTER — OFFICE VISIT (OUTPATIENT)
Dept: ORTHOPEDIC SURGERY | Age: 68
End: 2023-09-15

## 2023-09-15 DIAGNOSIS — M25.561 RIGHT KNEE PAIN, UNSPECIFIED CHRONICITY: Primary | ICD-10-CM

## 2023-09-15 RX ORDER — BETAMETHASONE SODIUM PHOSPHATE AND BETAMETHASONE ACETATE 3; 3 MG/ML; MG/ML
12 INJECTION, SUSPENSION INTRA-ARTICULAR; INTRALESIONAL; INTRAMUSCULAR; SOFT TISSUE ONCE
Status: COMPLETED | OUTPATIENT
Start: 2023-09-15 | End: 2023-09-15

## 2023-09-15 RX ORDER — BUPIVACAINE HYDROCHLORIDE 5 MG/ML
2 INJECTION, SOLUTION PERINEURAL ONCE
Status: COMPLETED | OUTPATIENT
Start: 2023-09-15 | End: 2023-09-15

## 2023-09-15 RX ORDER — LIDOCAINE HYDROCHLORIDE 10 MG/ML
2 INJECTION, SOLUTION INFILTRATION; PERINEURAL ONCE
Status: COMPLETED | OUTPATIENT
Start: 2023-09-15 | End: 2023-09-15

## 2023-09-15 RX ADMIN — BETAMETHASONE SODIUM PHOSPHATE AND BETAMETHASONE ACETATE 12 MG: 3; 3 INJECTION, SUSPENSION INTRA-ARTICULAR; INTRALESIONAL; INTRAMUSCULAR; SOFT TISSUE at 11:28

## 2023-09-15 RX ADMIN — BUPIVACAINE HYDROCHLORIDE 10 MG: 5 INJECTION, SOLUTION PERINEURAL at 11:32

## 2023-09-15 RX ADMIN — LIDOCAINE HYDROCHLORIDE 2 ML: 10 INJECTION, SOLUTION INFILTRATION; PERINEURAL at 11:35

## 2023-09-15 NOTE — PROGRESS NOTES
Not on file   Transportation Needs: Not on file   Physical Activity: Not on file   Stress: Not on file   Social Connections: Not on file   Intimate Partner Violence: Not on file   Housing Stability: Not on file     Past Medical History:   Diagnosis Date    Forearm fracture     Lt     Past Surgical History:   Procedure Laterality Date    FINGER CLOSED REDUCTION Left 8/1/2018    CLOSED REDUCTION PINNING INDEX FINGER performed by Ata Hickman MD at [address] Bilateral     arthroscopy    LAPAROSCOPY      tubal    CT MNPJ W/ANES SHOULDER JT APPL FIXATION APPARATUS Left 10/8/2018    SHOULDER MANIPULATION LEFT  & ALL FINGERS LEFT HAND performed by Ata Hickman MD at [address] Left 8/1/2018    WRIST OPEN REDUCTION INTERNAL FIXATION performed by Ata Hickman MD at [address] ARTHROSCOPY Left 1/21/2019    SHOULDER ARTHROSCOPY ROTATOR CUFF REPAIR, AND BICEPS TENODYSIS performed by Virgil Hull MD at [address] History   Problem Relation Age of Onset    Diabetes Father     Hypertension Father     Diabetes Mother     Hypertension Mother    Plan  An informed verbal consent for the procedure was obtained and risks including, but not limited to: allergy to medications, injection, bleeding, stiffness of joint, recurrence of symptoms, loss of function, swelling, drainage, irrigation, need for surgery and pseudo-septic inflammation, were explained to the patient. Also, discussed was the potential for further injections, irrigation and debridement and surgery. Alternate means of treatment have also been discussed with the patient. Under sterile conditions the patient's right knee was injected with lidocaine 2 cc bupivacaine 2 cc and Celestone 2 cc. She tolerated procedure well    Patient was given postinjection instructions. We wish her good luck we will see her back at her discretion                   Provider Attestation:  Yaneth Mejía

## 2023-11-13 RX ORDER — MELOXICAM 15 MG/1
15 TABLET ORAL DAILY
Qty: 90 TABLET | Refills: 1 | Status: SHIPPED | OUTPATIENT
Start: 2023-11-13

## 2024-04-05 ENCOUNTER — OFFICE VISIT (OUTPATIENT)
Dept: ORTHOPEDIC SURGERY | Age: 69
End: 2024-04-05

## 2024-04-05 DIAGNOSIS — M25.561 RIGHT KNEE PAIN, UNSPECIFIED CHRONICITY: Primary | ICD-10-CM

## 2024-04-05 RX ORDER — BUPIVACAINE HYDROCHLORIDE 5 MG/ML
2 INJECTION, SOLUTION PERINEURAL ONCE
Status: COMPLETED | OUTPATIENT
Start: 2024-04-05 | End: 2024-04-05

## 2024-04-05 RX ORDER — LIDOCAINE HYDROCHLORIDE 10 MG/ML
2 INJECTION, SOLUTION INFILTRATION; PERINEURAL ONCE
Status: COMPLETED | OUTPATIENT
Start: 2024-04-05 | End: 2024-04-05

## 2024-04-05 RX ORDER — BETAMETHASONE SODIUM PHOSPHATE AND BETAMETHASONE ACETATE 3; 3 MG/ML; MG/ML
12 INJECTION, SUSPENSION INTRA-ARTICULAR; INTRALESIONAL; INTRAMUSCULAR; SOFT TISSUE ONCE
Status: COMPLETED | OUTPATIENT
Start: 2024-04-05 | End: 2024-04-05

## 2024-04-05 RX ADMIN — BUPIVACAINE HYDROCHLORIDE 10 MG: 5 INJECTION, SOLUTION PERINEURAL at 11:27

## 2024-04-05 RX ADMIN — LIDOCAINE HYDROCHLORIDE 2 ML: 10 INJECTION, SOLUTION INFILTRATION; PERINEURAL at 11:28

## 2024-04-05 RX ADMIN — BETAMETHASONE SODIUM PHOSPHATE AND BETAMETHASONE ACETATE 12 MG: 3; 3 INJECTION, SUSPENSION INTRA-ARTICULAR; INTRALESIONAL; INTRAMUSCULAR; SOFT TISSUE at 11:27

## 2024-04-05 NOTE — PROGRESS NOTES
[facility]                   Francisco J Price M.D.            [address], [address]               [place], [place] [address]           Dept Phone: [phone]           Dept Fax:  [phone] [address] [facility]                       [address]           [place], [place] [address]          Dept Phone: [phone]           Dept Fax:  [phone]      Chief Compliant:  Chief Complaint   Patient presents with    Pain     Rt knee        History of Present Illness:  This is a 68 y.o. female who presents to the clinic today for evaluation / follow up of bilateral knee pain right greater than left patient has a significant DJD of the right knee and her left knee is beginning to bother her as well she has more complaints however the right knee she has had injections in the past on the right knee.  She says he is not ready to have them done on the left but she may consider this in the future..       Review of Systems   Constitutional: Negative for fever, chills, sweats.   Eyes: Negative for changes in vision, or pain.   HENT: Negative for ear ache, epistaxis, or sore throat.  Respiratory/Cardio: Negative for Chest pain, palpitations, SOB, or cough.  Gastrointestinal: Negative for abdominal pain, N/V/D.   Genitourinary: Negative for dysuria, frequency, urgency, or hematuria.   Neurological: Negative for headache, numbness, or weakness.   Integumentary: Negative for rash, itching, laceration, or abrasion.   Musculoskeletal: Positive for Pain (Rt knee)       Physical Exam:  Constitutional: Patient is oriented to person, place, and time. Patient appears well-developed and well nourished.   HENT: Negative otherwise noted  Head: Normocephalic and Atraumatic  Nose: Normal  Eyes: Conjunctivae and EOM are normal  Neck: Normal range of motion Neck supple.    Respiratory/Cardio: Effort normal. No respiratory distress.  Musculoskeletal: Examination of patient's right knee notes her 
no change

## 2024-06-24 RX ORDER — MELOXICAM 15 MG/1
15 TABLET ORAL DAILY
Qty: 90 TABLET | Refills: 1 | Status: SHIPPED | OUTPATIENT
Start: 2024-06-24

## 2024-08-15 ENCOUNTER — TELEPHONE (OUTPATIENT)
Dept: ORTHOPEDIC SURGERY | Age: 69
End: 2024-08-15

## 2024-08-15 NOTE — TELEPHONE ENCOUNTER
Patient called to schedule appointment for left knee injection,  Patient would like to come in the last week of this month. Pre service unable to accommodate  Patient states she is going out of country beginning of September and wanted to get one before she leaves  Wanting seen at [place] office  Please return call  Thank you

## 2024-08-23 ENCOUNTER — OFFICE VISIT (OUTPATIENT)
Dept: ORTHOPEDIC SURGERY | Age: 69
End: 2024-08-23

## 2024-08-23 DIAGNOSIS — M25.561 RIGHT KNEE PAIN, UNSPECIFIED CHRONICITY: Primary | ICD-10-CM

## 2024-08-23 RX ORDER — BUPIVACAINE HYDROCHLORIDE 5 MG/ML
2 INJECTION, SOLUTION PERINEURAL ONCE
Status: COMPLETED | OUTPATIENT
Start: 2024-08-23 | End: 2024-08-23

## 2024-08-23 RX ORDER — BETAMETHASONE SODIUM PHOSPHATE AND BETAMETHASONE ACETATE 3; 3 MG/ML; MG/ML
12 INJECTION, SUSPENSION INTRA-ARTICULAR; INTRALESIONAL; INTRAMUSCULAR; SOFT TISSUE ONCE
Status: COMPLETED | OUTPATIENT
Start: 2024-08-23 | End: 2024-08-23

## 2024-08-23 RX ORDER — LIDOCAINE HYDROCHLORIDE 10 MG/ML
2 INJECTION, SOLUTION INFILTRATION; PERINEURAL ONCE
Status: COMPLETED | OUTPATIENT
Start: 2024-08-23 | End: 2024-08-23

## 2024-08-23 RX ADMIN — BUPIVACAINE HYDROCHLORIDE 10 MG: 5 INJECTION, SOLUTION PERINEURAL at 10:54

## 2024-08-23 RX ADMIN — BETAMETHASONE SODIUM PHOSPHATE AND BETAMETHASONE ACETATE 12 MG: 3; 3 INJECTION, SUSPENSION INTRA-ARTICULAR; INTRALESIONAL; INTRAMUSCULAR; SOFT TISSUE at 10:53

## 2024-08-23 RX ADMIN — LIDOCAINE HYDROCHLORIDE 2 ML: 10 INJECTION, SOLUTION INFILTRATION; PERINEURAL at 10:54

## 2024-08-23 NOTE — PROGRESS NOTES
[facility]                   Francisco J Price M.D.            [address], [address]               [place], [place] [address]           Dept Phone: [phone]           Dept Fax:  [phone] [address] [facility]                       [address]           [place], [place] [address]          Dept Phone: [phone]           Dept Fax:  [phone]      Chief Compliant:  Chief Complaint   Patient presents with    Pain     Both knees        History of Present Illness:  This is a 68 y.o. female who presents to the clinic today for evaluation / follow up of bilateral knee pain right greater than left she has pretty significant degenerative joint disease of both knees right greater than left.  She has been doing fairly well with injections the right one was a robot of some for her now and she like to get it injected again and she is going on a trip to [place].       Review of Systems   Constitutional: Negative for fever, chills, sweats.   Eyes: Negative for changes in vision, or pain.   HENT: Negative for ear ache, epistaxis, or sore throat.  Respiratory/Cardio: Negative for Chest pain, palpitations, SOB, or cough.  Gastrointestinal: Negative for abdominal pain, N/V/D.   Genitourinary: Negative for dysuria, frequency, urgency, or hematuria.   Neurological: Negative for headache, numbness, or weakness.   Integumentary: Negative for rash, itching, laceration, or abrasion.   Musculoskeletal: Positive for Pain (Both knees)       Physical Exam:  Constitutional: Patient is oriented to person, place, and time. Patient appears well-developed and well nourished.   HENT: Negative otherwise noted  Head: Normocephalic and Atraumatic  Nose: Normal  Eyes: Conjunctivae and EOM are normal  Neck: Normal range of motion Neck supple.    Respiratory/Cardio: Effort normal. No respiratory distress.  Musculoskeletal: Examination the patient's right knee

## (undated) DEVICE — Device

## (undated) DEVICE — BIT DRL L100MM DIA2MM QUIK CPL W/O STP REUSE

## (undated) DEVICE — DRESSING TRNSPAR W5XL4.5IN FLM SHT SEMIPERMEABLE WIND

## (undated) DEVICE — SUTURE VCRL + SZ 2-0 L27IN ABSRB UD CT-2 L26MM 1/2 CIR TAPR VCP269H

## (undated) DEVICE — BANDAGE,GAUZE,BULKEE II,4.5"X4.1YD,STRL: Brand: MEDLINE

## (undated) DEVICE — SPONGE LAP W18XL18IN WHT COT 4 PLY FLD STRUNG RADPQ DISP ST

## (undated) DEVICE — NEEDLE SPNL 18GA L3.5IN W/ QNCKE SHARPER BVL DURA CLICK

## (undated) DEVICE — POUCH INSTR W6.75XL11.5IN FRST 2 PKT ADH FOR ORTH AND

## (undated) DEVICE — TOWEL,OR,DSP,ST,BLUE,STD,6/PK,12PK/CS: Brand: MEDLINE

## (undated) DEVICE — PADDING UNDERCAST W3INXL4YD PURE COT FBR LO LINTING WYTEX

## (undated) DEVICE — SPLINT THMB W3XL12IN FBRGLS PD PRECUT LTWT DURABLE FAST SET

## (undated) DEVICE — SHEET, ORTHO, SPLIT, STERILE: Brand: MEDLINE

## (undated) DEVICE — COVER,MAYO STAND,XL,STERILE: Brand: MEDLINE

## (undated) DEVICE — PENCIL ES L3M BTTN SWCH HOLSTER W/ BLDE ELECTRD EDGE

## (undated) DEVICE — DRAPE C ARM W104XL188CM FLD MOB XR

## (undated) DEVICE — GLOVE SURG SZ 8 L12IN FNGR THK13MIL BRN LTX SYN POLYMER W

## (undated) DEVICE — IMMOBILIZER SHLDR L10.5-17IN D7IN SLNG W/ 15DEG ABD PLLW

## (undated) DEVICE — SCREW BNE L16MM DIA2.4MM CORT S STL ST T8 STARDRV RECESS
Type: IMPLANTABLE DEVICE | Site: WRIST | Status: NON-FUNCTIONAL
Removed: 2018-08-01

## (undated) DEVICE — 3M™ WARMING BLANKET, LOWER BODY, 10 PER CASE, 42568: Brand: BAIR HUGGER™

## (undated) DEVICE — YANKAUER,FLEXIBLE HANDLE,FINE CAPACITY: Brand: MEDLINE

## (undated) DEVICE — GAUZE,SPONGE,FLUFF,6"X6.75",STRL,5/TRAY: Brand: MEDLINE

## (undated) DEVICE — 1010 S-DRAPE TOWEL DRAPE 10/BX: Brand: STERI-DRAPE™

## (undated) DEVICE — CUFF REPROCESS BP TOURN SING BLDR 2 PORT 4X24IN

## (undated) DEVICE — DISCONTINUED NO SUB IDED TG GLOVE SURG SENSICARE ALOE LT LF PF ST GRN SZ 8

## (undated) DEVICE — DRAPE,REIN 53X77,STERILE: Brand: MEDLINE

## (undated) DEVICE — BANDAGE,SELF ADHRNT,COFLEX,4"X5YD,STRL: Brand: COLABEL

## (undated) DEVICE — DISC ABSRB YEL FLR POLYETH MGMT SUCT QUICKSUITE

## (undated) DEVICE — BLADE SAW RESECTOR CUT 4MM

## (undated) DEVICE — GLOVE ORANGE PI 7 1/2   MSG9075

## (undated) DEVICE — GLOVE ORANGE PI 7   MSG9070

## (undated) DEVICE — SYRINGE IRRIG 60ML SFT PLIABLE BLB EZ TO GRP 1 HND USE W/

## (undated) DEVICE — [ARTHROSCOPY PUMP,  DO NOT USE IF PACKAGE IS DAMAGED,  KEEP DRY,  KEEP AWAY FROM SUNLIGHT,  PROTECT FROM HEAT AND RADIOACTIVE SOURCES.]: Brand: FLOSTEADY

## (undated) DEVICE — NEEDLE SUT PASS FOR ROT CUF LABRAL REP MULTFI SCORPION

## (undated) DEVICE — FLEXIBLE YANKAUER,LARGE TIP, NO VACUUM CONTROL: Brand: ARGYLE

## (undated) DEVICE — DRESSING,GAUZE,XEROFORM,CURAD,1"X8",ST: Brand: CURAD

## (undated) DEVICE — MARKER,SKIN,WI/RULER AND LABELS: Brand: MEDLINE

## (undated) DEVICE — SUTURE FIBERWIRE SZ 2 W/ TAPERED NEEDLE BLUE L38IN NONABSORB BLU L26.5MM 1/2 CIRCLE AR7200

## (undated) DEVICE — CANNULA ARTHSCP L3CM ID8MM DBL DAM 1 PC MOLD LO PROF FLNG

## (undated) DEVICE — GOWN,SIRUS,NONRNF,SETINSLV,XL,20/CS: Brand: MEDLINE

## (undated) DEVICE — 3M™ STERI-DRAPE™ U-DRAPE 1015: Brand: STERI-DRAPE™

## (undated) DEVICE — SYRINGE MED 50ML LUERLOCK TIP

## (undated) DEVICE — TUBING, SUCTION, 3/16" X 10', STRAIGHT: Brand: MEDLINE

## (undated) DEVICE — PADDING UNDERCAST W4INXL4YD COT FBR LO LINTING WYTEX

## (undated) DEVICE — 90-S, SUCTION PROBE, NON-BENDABLE, MAX CUT LEVEL 11: Brand: SERFAS ENERGY

## (undated) DEVICE — GOWN,AURORA,NONREINFORCED,LARGE: Brand: MEDLINE

## (undated) DEVICE — SUTURE PROL SZ 3-0 L18IN NONABSORBABLE BLU L30MM FS-1 3/8 8663G

## (undated) DEVICE — BIT DRL L110MM DIA1.8MM QUIK CPL CALIB W/O STP REUSE

## (undated) DEVICE — ZIMMER® STERILE DISPOSABLE TOURNIQUET CUFF WITH PLC, DUAL PORT, SINGLE BLADDER, 18 IN. (46 CM)

## (undated) DEVICE — SOLUTION IV IRRIG POUR BRL 0.9% SODIUM CHL 2F7124

## (undated) DEVICE — KIT POS ULT SHLDR PT CARE REHAB SLNG

## (undated) DEVICE — SOLUTION IV IRRIG LACTATED RINGERS 3000ML 2B7487

## (undated) DEVICE — 4-PORT MANIFOLD: Brand: NEPTUNE 2

## (undated) DEVICE — SUTURE ETHLN SZ 3-0 L18IN NONABSORBABLE BLK FS-1 L24MM 3/8 663H

## (undated) DEVICE — SOLUTION IV IRRIG WATER 1000ML POUR BRL 2F7114

## (undated) DEVICE — Device: Brand: LEVEL 1